# Patient Record
Sex: MALE
[De-identification: names, ages, dates, MRNs, and addresses within clinical notes are randomized per-mention and may not be internally consistent; named-entity substitution may affect disease eponyms.]

---

## 2018-03-21 ENCOUNTER — HOSPITAL ENCOUNTER (INPATIENT)
Dept: HOSPITAL 5 - ED | Age: 73
LOS: 6 days | Discharge: HOME | DRG: 871 | End: 2018-03-27
Attending: INTERNAL MEDICINE | Admitting: INTERNAL MEDICINE
Payer: MEDICARE

## 2018-03-21 DIAGNOSIS — E66.01: ICD-10-CM

## 2018-03-21 DIAGNOSIS — D69.6: ICD-10-CM

## 2018-03-21 DIAGNOSIS — R31.9: ICD-10-CM

## 2018-03-21 DIAGNOSIS — N13.30: ICD-10-CM

## 2018-03-21 DIAGNOSIS — Z87.442: ICD-10-CM

## 2018-03-21 DIAGNOSIS — N35.9: ICD-10-CM

## 2018-03-21 DIAGNOSIS — N39.0: ICD-10-CM

## 2018-03-21 DIAGNOSIS — N18.9: ICD-10-CM

## 2018-03-21 DIAGNOSIS — N13.9: ICD-10-CM

## 2018-03-21 DIAGNOSIS — A41.9: Primary | ICD-10-CM

## 2018-03-21 DIAGNOSIS — E87.1: ICD-10-CM

## 2018-03-21 DIAGNOSIS — Z79.01: ICD-10-CM

## 2018-03-21 DIAGNOSIS — R74.0: ICD-10-CM

## 2018-03-21 DIAGNOSIS — N17.0: ICD-10-CM

## 2018-03-21 DIAGNOSIS — N20.0: ICD-10-CM

## 2018-03-21 DIAGNOSIS — I48.2: ICD-10-CM

## 2018-03-21 DIAGNOSIS — G47.33: ICD-10-CM

## 2018-03-21 DIAGNOSIS — R65.21: ICD-10-CM

## 2018-03-21 DIAGNOSIS — I12.9: ICD-10-CM

## 2018-03-21 DIAGNOSIS — E11.22: ICD-10-CM

## 2018-03-21 DIAGNOSIS — N20.1: ICD-10-CM

## 2018-03-21 DIAGNOSIS — Z79.899: ICD-10-CM

## 2018-03-21 DIAGNOSIS — Z72.0: ICD-10-CM

## 2018-03-21 LAB
ALBUMIN SERPL-MCNC: 2.8 G/DL (ref 3.9–5)
ALT SERPL-CCNC: 58 UNITS/L (ref 7–56)
APTT BLD: 40.2 SEC. (ref 24.2–36.6)
APTT BLD: 43.9 SEC. (ref 24.2–36.6)
BACTERIA #/AREA URNS HPF: (no result) /HPF
BAND NEUTROPHILS # (MANUAL): 0.5 K/MM3
BASOPHILS # (AUTO): 0 K/MM3 (ref 0–0.1)
BASOPHILS NFR BLD AUTO: 0.3 % (ref 0–1.8)
BILIRUB UR QL STRIP: (no result)
BLOOD UR QL VISUAL: (no result)
BUN SERPL-MCNC: 70 MG/DL (ref 9–20)
BUN SERPL-MCNC: 70 MG/DL (ref 9–20)
BUN SERPL-MCNC: 79 MG/DL (ref 9–20)
BUN/CREAT SERPL: 21 %
BUN/CREAT SERPL: 21 %
BUN/CREAT SERPL: 23 %
CALCIUM SERPL-MCNC: 8.1 MG/DL (ref 8.4–10.2)
CALCIUM SERPL-MCNC: 8.6 MG/DL (ref 8.4–10.2)
CALCIUM SERPL-MCNC: 9.5 MG/DL (ref 8.4–10.2)
EOSINOPHIL # BLD AUTO: 0 K/MM3 (ref 0–0.4)
EOSINOPHIL NFR BLD AUTO: 0.2 % (ref 0–4.3)
HCT VFR BLD CALC: 43.3 % (ref 35.5–45.6)
HCT VFR BLD CALC: 48.7 % (ref 35.5–45.6)
HEMOLYSIS INDEX: 22
HEMOLYSIS INDEX: 3
HEMOLYSIS INDEX: 59
HGB BLD-MCNC: 14.2 GM/DL (ref 11.8–15.2)
HGB BLD-MCNC: 16.3 GM/DL (ref 11.8–15.2)
INR PPP: 1.59 (ref 0.87–1.13)
INR PPP: 1.65 (ref 0.87–1.13)
LYMPHOCYTES # BLD AUTO: 0.8 K/MM3 (ref 1.2–5.4)
LYMPHOCYTES NFR BLD AUTO: 6.5 % (ref 13.4–35)
MCH RBC QN AUTO: 30 PG (ref 28–32)
MCH RBC QN AUTO: 30 PG (ref 28–32)
MCHC RBC AUTO-ENTMCNC: 33 % (ref 32–34)
MCHC RBC AUTO-ENTMCNC: 33 % (ref 32–34)
MCV RBC AUTO: 91 FL (ref 84–94)
MCV RBC AUTO: 93 FL (ref 84–94)
MONOCYTES # (AUTO): 1.8 K/MM3 (ref 0–0.8)
MONOCYTES % (AUTO): 14.7 % (ref 0–7.3)
MUCOUS THREADS #/AREA URNS HPF: (no result) /HPF
MYELOCYTES # (MANUAL): 0.2 K/MM3
PH UR STRIP: 5 [PH] (ref 5–7)
PLATELET # BLD: 121 K/MM3 (ref 140–440)
PLATELET # BLD: 136 K/MM3 (ref 140–440)
PROMYELOCYTES # (MANUAL): 0 K/MM3
RBC # BLD AUTO: 4.67 M/MM3 (ref 3.65–5.03)
RBC # BLD AUTO: 5.37 M/MM3 (ref 3.65–5.03)
RBC #/AREA URNS HPF: > 182 /HPF (ref 0–6)
TOTAL CELLS COUNTED BLD: 100
UROBILINOGEN UR-MCNC: < 2 MG/DL (ref ?–2)
WBC #/AREA URNS HPF: 37 /HPF (ref 0–6)

## 2018-03-21 PROCEDURE — 80048 BASIC METABOLIC PNL TOTAL CA: CPT

## 2018-03-21 PROCEDURE — 85007 BL SMEAR W/DIFF WBC COUNT: CPT

## 2018-03-21 PROCEDURE — 85610 PROTHROMBIN TIME: CPT

## 2018-03-21 PROCEDURE — 96367 TX/PROPH/DG ADDL SEQ IV INF: CPT

## 2018-03-21 PROCEDURE — 82140 ASSAY OF AMMONIA: CPT

## 2018-03-21 PROCEDURE — 80053 COMPREHEN METABOLIC PANEL: CPT

## 2018-03-21 PROCEDURE — 82803 BLOOD GASES ANY COMBINATION: CPT

## 2018-03-21 PROCEDURE — C1769 GUIDE WIRE: HCPCS

## 2018-03-21 PROCEDURE — 96375 TX/PRO/DX INJ NEW DRUG ADDON: CPT

## 2018-03-21 PROCEDURE — 85730 THROMBOPLASTIN TIME PARTIAL: CPT

## 2018-03-21 PROCEDURE — 82962 GLUCOSE BLOOD TEST: CPT

## 2018-03-21 PROCEDURE — 87040 BLOOD CULTURE FOR BACTERIA: CPT

## 2018-03-21 PROCEDURE — 94640 AIRWAY INHALATION TREATMENT: CPT

## 2018-03-21 PROCEDURE — 93005 ELECTROCARDIOGRAM TRACING: CPT

## 2018-03-21 PROCEDURE — 96365 THER/PROPH/DIAG IV INF INIT: CPT

## 2018-03-21 PROCEDURE — 36415 COLL VENOUS BLD VENIPUNCTURE: CPT

## 2018-03-21 PROCEDURE — C1758 CATHETER, URETERAL: HCPCS

## 2018-03-21 PROCEDURE — 85025 COMPLETE CBC W/AUTO DIFF WBC: CPT

## 2018-03-21 PROCEDURE — 74176 CT ABD & PELVIS W/O CONTRAST: CPT

## 2018-03-21 PROCEDURE — 0T768DZ DILATION OF RIGHT URETER WITH INTRALUMINAL DEVICE, VIA NATURAL OR ARTIFICIAL OPENING ENDOSCOPIC: ICD-10-PCS | Performed by: UROLOGY

## 2018-03-21 PROCEDURE — C1726 CATH, BAL DIL, NON-VASCULAR: HCPCS

## 2018-03-21 PROCEDURE — 93010 ELECTROCARDIOGRAM REPORT: CPT

## 2018-03-21 PROCEDURE — C2617 STENT, NON-COR, TEM W/O DEL: HCPCS

## 2018-03-21 PROCEDURE — 81001 URINALYSIS AUTO W/SCOPE: CPT

## 2018-03-21 PROCEDURE — 93306 TTE W/DOPPLER COMPLETE: CPT

## 2018-03-21 PROCEDURE — 82805 BLOOD GASES W/O2 SATURATION: CPT

## 2018-03-21 PROCEDURE — A4217 STERILE WATER/SALINE, 500 ML: HCPCS

## 2018-03-21 PROCEDURE — 94760 N-INVAS EAR/PLS OXIMETRY 1: CPT

## 2018-03-21 PROCEDURE — 71045 X-RAY EXAM CHEST 1 VIEW: CPT

## 2018-03-21 PROCEDURE — 74018 RADEX ABDOMEN 1 VIEW: CPT

## 2018-03-21 PROCEDURE — 86140 C-REACTIVE PROTEIN: CPT

## 2018-03-21 PROCEDURE — 87086 URINE CULTURE/COLONY COUNT: CPT

## 2018-03-21 PROCEDURE — 36600 WITHDRAWAL OF ARTERIAL BLOOD: CPT

## 2018-03-21 PROCEDURE — 94660 CPAP INITIATION&MGMT: CPT

## 2018-03-21 RX ADMIN — EZETIMIBE SCH MG: 10 TABLET ORAL at 23:52

## 2018-03-21 RX ADMIN — OXYCODONE HYDROCHLORIDE AND ACETAMINOPHEN SCH MG: 500 TABLET ORAL at 23:51

## 2018-03-21 RX ADMIN — PRAVASTATIN SODIUM SCH MG: 80 TABLET ORAL at 23:52

## 2018-03-21 RX ADMIN — SODIUM CHLORIDE SCH MLS/HR: 0.9 INJECTION, SOLUTION INTRAVENOUS at 17:05

## 2018-03-21 RX ADMIN — Medication SCH ML: at 22:00

## 2018-03-21 RX ADMIN — ALBUTEROL SULFATE PRN MG: 2.5 SOLUTION RESPIRATORY (INHALATION) at 17:50

## 2018-03-21 NOTE — CONSULTATION
History of Present Illness


Consult date: 03/21/18


Requesting physician: JUNE APONTE


Reason for consult: other (Severe Sepsis)


History of present illness: 





PULMONARY/CCM CONSULT NOTE (Full dictation # [id number])


Please see dictated notes for full details





Medications and Allergies


 Allergies











Allergy/AdvReac Type Severity Reaction Status Date / Time


 


No Known Allergies Allergy   Verified 03/21/18 11:29











 Home Medications











 Medication  Instructions  Recorded  Confirmed  Last Taken  Type


 


ALBUTEROL Inhaler [Proair] 2 puff IH QID PRN 03/21/18 03/21/18 Unknown History


 


Allopurinol [Zyloprim] 300 mg PO QDAY 03/21/18 03/21/18 03/20/18 History


 


Apixaban [Eliquis] 5 mg PO BID 03/21/18 03/21/18 03/20/18 History


 


Ascorbic Acid [Vitamin C] 500 mg PO BID 03/21/18 03/21/18 03/20/18 History


 


Aspirin [Lo-Dose Aspirin EC] 81 mg PO DAILY 03/21/18 03/21/18 03/20/18 History


 


Calcium Carbonate/Vitamin D3 2 each PO DAILY 03/21/18 03/21/18 03/20/18 History





[Calcium 600-Vit D3 500 Softgel]     


 


Cyanocobalamin [Vitamin B-12] 1,000 mcg PO DAILY 03/21/18 03/21/18 03/20/18 

History


 


Dulaglutide [Trulicity] 1.5 mg SQ QWEEK 03/21/18 03/21/18 03/20/18 History


 


Ergocalciferol (Vitamin D2) 2,000 unit PO BID 03/21/18 03/21/18 Unknown History





[Vitamin D2]     


 


Ezetimibe/Simvastatin (Nf) 1 tab PO QHS 03/21/18 03/21/18 03/20/18 History





[Vytorin 10-40 mg (Nf)]     


 


Furosemide [Lasix TAB] 40 mg PO BID 03/21/18 03/21/18 03/20/18 History


 


Glimepiride [Amaryl] 4 mg PO QAM 03/21/18 03/21/18 03/20/18 History


 


Glucosamine/D3/Boswellia Kajal 1 each PO DAILY 03/21/18 03/21/18 03/20/18 

History





[Osteo Bi-Flex Tablet]     


 


Insulin Regular, Human [HumuLIN R] 0 unit SQ ACHS 03/21/18 03/21/18 03/20/18 

History


 


Linaclotide [Linzess] 145 mcg PO QDAY 03/21/18 03/21/18 03/20/18 History


 


Lisinopril [Zestril TAB] 40 mg PO QDAY 03/21/18 03/21/18 03/20/18 History


 


Multivit-Min/FA/Lycopen/Lutein 1 each PO DAILY 03/21/18 03/21/18 03/20/18 

History





[Adults 50+ Multivitamin Tablet]     


 


Naproxen Sodium [Aleve] 220 mg PO BID 03/21/18 03/21/18 03/20/18 History


 


Omega-3/Dha/Epa/Fish Oil [Fish Oil 1 each PO DAILY 03/21/18 03/21/18 03/20/18 

History





1,200 mg Softgel]     


 


Potassium Chloride [Klor-Con M20] 20 meq PO DAILY 03/21/18 03/21/18 03/20/18 

History


 


Zinc Acetate [Galzin] 50 mg PO 4XW 03/21/18 03/21/18 03/20/18 History











Active Meds: 


Active Medications





Piperacillin Sod/Tazobactam Sod (Zosyn/Ns 4.5gm/100ml)  4.5 gm in 100 mls @ 200 

mls/hr IV ONCE THEA


   Last Admin: 03/21/18 12:47 Dose:  200 mls/hr


Gentamicin Sulfate 160 mg/ (Sodium Chloride)  104 mls @ 200 mls/hr IV ONCE ONE; 

Protocol


   Stop: 03/21/18 14:07


Ceftriaxone Sodium (Rocephin/Ns 1 Gm/50 Ml)  1 gm in 50 mls @ 100 mls/hr IV 

ONCE ONE; Protocol


   Stop: 03/21/18 14:06











Physical Examination


Vital signs: 


 Vital Signs











Temp Pulse Resp BP Pulse Ox


 


 99.0 F   136 H  20   79/50   94 


 


 03/21/18 11:14  03/21/18 11:14  03/21/18 11:14  03/21/18 11:14  03/21/18 11:14














Results





- Laboratory Findings


CBC and BMP: 


 03/21/18 11:27





 03/21/18 11:27


PT/INR, D-dimer











PT  20.5 Sec. (12.2-14.9)  H  03/21/18  11:27    


 


INR  1.65  (0.87-1.13)  H  03/21/18  11:27    








Abnormal lab findings: 


 Abnormal Labs











  03/21/18 03/21/18 03/21/18





  11:27 11:27 11:27


 


WBC  12.1 H  


 


RBC  5.37 H  


 


Hgb  16.3 H  


 


Hct  48.7 H  


 


RDW  15.7 H  


 


Plt Count  136 L  


 


Lymph % (Auto)  6.5 L  


 


Mono % (Auto)  14.7 H  


 


Lymph #  0.8 L  


 


Mono #  1.8 H  


 


Seg Neutrophils %  78.3 H  


 


Seg Neutrophils #  9.5 H  


 


PT   20.5 H 


 


INR   1.65 H 


 


APTT   40.2 H 


 


VBG pH   


 


Sodium    133 L


 


Chloride    93.9 L


 


Carbon Dioxide    20 L


 


BUN    79 H


 


Creatinine    3.8 H


 


Glucose    153 H


 


Lactic Acid   


 


AST    75 H


 


ALT    58 H


 


Alkaline Phosphatase    142 H


 


Albumin    2.8 L














  03/21/18 03/21/18





  11:27 11:27


 


WBC  


 


RBC  


 


Hgb  


 


Hct  


 


RDW  


 


Plt Count  


 


Lymph % (Auto)  


 


Mono % (Auto)  


 


Lymph #  


 


Mono #  


 


Seg Neutrophils %  


 


Seg Neutrophils #  


 


PT  


 


INR  


 


APTT  


 


VBG pH   7.316 L


 


Sodium  


 


Chloride  


 


Carbon Dioxide  


 


BUN  


 


Creatinine  


 


Glucose  


 


Lactic Acid  2.30 H* 


 


AST  


 


ALT  


 


Alkaline Phosphatase  


 


Albumin

## 2018-03-21 NOTE — XRAY REPORT
FINAL REPORT



EXAM:  XR CHEST 1V AP



HISTORY:  SOB 



TECHNIQUE:  AP portable view of the chest



PRIORS:  None.



FINDINGS:  

Lines, tubes, and devices:  N/A



Lungs and pleura: Trachea is normal in position. Lungs are clear

of infiltrate, pleural effusion, vascular congestion, or

pneumothorax. 



Cardiomediastinal silhouette: Cardiac and mediastinal silhouettes

are unremarkable.



Other: Bony structures are intact.



IMPRESSION:  

No acute cardiopulmonary process seen.

## 2018-03-21 NOTE — POST ANESTHESIA EVALUATION
- Post Anesthesia Evaluation


Patient Participated: Yes


Airway Patent: Yes


Stable Respiratory Function: Yes


Nausea/Vomiting: No


Temp > 96.8F: Yes


Pain Manageable: Yes (Pt states he feels much better post op, No Pain)


Adequeate Hydration: No (Pt requiring pressors to maintain MAP 60.)


Anesthesia Complications: Yes (required epinephrine to maintain BP, stable in 

PACU)

## 2018-03-21 NOTE — CAT SCAN REPORT
CT ABDOMEN PELVIS WITHOUT CONTRAST:



HISTORY:  Recent UTI, air around kidney on recent CT.



COMPARISON: none.



TECHNIQUE:  Helical CT in 1.25mm intervals without IV contrast. 

Sagittal and coronal reconstructions. 





FINDINGS:



Lung bases: Normal.



Liver: Normal.



Biliary system: Normal.



Pancreas: Normal.



Spleen: Normal.



Kidneys/ureters/bladder: Bilateral nephrolithiasis is identified. A 1.4 

cm stone is identified in the mid to distal right ureter at the level 

of the pelvic brim. There is moderate right hydronephrosis. Normal 

bladder.



Adrenal glands: Normal.



Aorta: Normal.



Intestines: Normal.



Appendix: Normal.



Ascites: None.



Adenopathy: None.



Musculoskeletal: Thoracolumbar spondylosis.





IMPRESSION:

Bilateral nephrolithiasis. 1.4 cm stone in the mid to distal right 

ureter, obstructing.

## 2018-03-21 NOTE — ANESTHESIA CONSULTATION
Anesthesia Consult and Med Hx





- Airway


Anesthetic Teeth Evaluation: Good


ROM Head & Neck: Adequate


Mental/Hyoid Distance: Adequate


Mallampati Class: Class III


Intubation Access Assessment: Possibly Difficult





- Pulmonary Exam


CTA: Yes





- Cardiac Exam


Cardiac Exam: RRR





- Pre-Operative Health Status


ASA Pre-Surgery Classification: ASA3


Proposed Anesthetic Plan: General, MAC





- Pulmonary


SOB: Yes


Hx Sleep Apnea: Yes





- Cardiovascular System


Hx Hypertension: Yes


Hx Cardia Arrhythmia: Yes (Pt is on Eliquis for Afib. Currently in Afib 

confirmed by ECG.)


Hx Pacemaker: No


Hx Internal Defibrillator: No





- Endocrine


Hx Renal Disease: Yes (ARF, Large Calculi, BUN/Creat elevated. no hx of 

dialysis.)


Hx Non-Insulin Dependent Diabetes: Yes


Hx Thyroid Disease: No





- Hematic


Hx Anemia: No





- Other Systems


Hx Cancer: No


Hx Obesity: Yes (BMI 48.1)





- Additional Comments


Anesthesia Medical History Comments: Pt currently on Eliquis for Afib. Large 

Neck Circumference. JEAN/CPAPA. Has good peripheral IV access. Currently getting 

IV hydration for hypotension and sepsis. Not on any pressors at the time. Plan 

to take to OR for cystoscopy with MAC, and GA as backup.

## 2018-03-21 NOTE — OPERATIVE REPORT
PREOPERATIVE DIAGNOSES:  Urosepsis, obstructing right ureteral stone with

sepsis, hypotension.



POSTOPERATIVE DIAGNOSES:  Urosepsis, obstructing right ureteral stone with

sepsis, hypotension, and urethral stricture.



PROCEDURE:  Cystoscopy, direct vision internal urethrotomy, right ureteral

stent.



SURGEON:  Eduar Jacobson M.D.



ANESTHESIA:  General.



FINDINGS:  This is a gentleman who presented with sepsis.  He had hydronephrosis

compared to the CT 6 days ago and an obstructing stone.  He was hypotensive.  He

was hydrated and given broad-spectrum antibiotics.  He has renal failure and we

consulted Interventional Radiology who felt very risky to do a percutaneous

nephrostomy at this time, so we will try a stent.



DESCRIPTION OF PROCEDURE:  The patient was brought to the operating room and

placed on the operating table.  Following the induction of anesthesia, he was as

mentioned hypotensive.  We saw a stricture in the mid penile urethra and at the

bulbomembranous junction.  A wire coiled in the bladder and internal urethrotomy

was done.  Once we got into the bladder, the bladder neck was high riding.  We

found the orifice, which was quite a distance away from the urethral meatus.  We

were able to place a wire into the kidney and place a 6-Yoruba double J.  The

patient tolerated the procedure well.  He is in guarded condition.  He has got a

much better blood pressure.  Lots of pus came out of the kidney, which was sent

for culture and a Quiñones catheter was placed with the Councill catheter over a

wire.  He was brought to recovery in stable condition.





DD: 03/21/2018 16:39

DT: 03/21/2018 17:02

JOB# [id number]  [id number]

NIHARIKA/UMAIR

## 2018-03-21 NOTE — XRAY REPORT
AP CHEST:



HISTORY: Possible sepsis



AP view of the chest demonstrates a normal mediastinal and

cardiac contour with clear lungs and normal bony and soft tissue

structures.



IMPRESSION:

Unremarkable AP chest.

## 2018-03-21 NOTE — CONSULTATION
CONSULTING PHYSICIAN:  Dr. Lyndsey Elizabeth, Emergency Room doctor.



REASON FOR CONSULTATION:  Severe sepsis, right obstructing ureteral stone, NATALIE.



CHIEF COMPLAINT AND HISTORY OF PRESENT ILLNESS:  The patient is a 72-year-old

 male with past medical history amongst other things significant for a

diagnoses of atrial fibrillation, on Eliquis, but also nephrolithiasis and

morbidly obese.  He actually tells me he has obstructive sleep apnea.  He is on

home CPAP therapy.  He came into the Emergency Room, actually directed there

from his urologist's office.  He stated that he had seen the urologist a few

days back and was treated with antibiotics for essentially dysuria.  The pain

got much better.  However, he said that when he went for a followup visit, he

was told to come into the Emergency Room.  This really is secondary to a repeat

CT scan which showed an obstructive stone with obstructive uropathy.  In the

Emergency Room, he was found to be hypotensive and hence the consult.  He has

tentatively been scheduled to be taken into the OR I believe for a cystoscopy

plus or minus other intervention by the urologist.  When I stopped by to see

him, he was resting in bed.  He was lying flat, denied any orthopnea.  He denied

any dizziness and really did not show any significant confusion during the

discussion.  Nevertheless, his mean arterial pressures ran around 50 mmHg at

that time.  He denied nausea, vomiting, or overt aspiration.  Denied any real

fevers or chills.  Denied any new onset leg pain or swelling either unilaterally

or bilaterally.  He has been compliant with his medications including his CPAP

therapy at bedtime.  He has about a 10+ pack-year tobacco smoking history, but

denies any current tobacco use.  It really is as much of the history of

presentation as I have.



PAST MEDICAL HISTORY:  Again, significant for diabetes, atrial fibrillation,

morbid obesity, obstructive sleep apnea, hypertension and nephrolithiasis.



PAST SURGICAL HISTORY:  Unknown.



MEDICATIONS:  He was on at the time I stopped by to see him, according to the

medication administration record included the following:  He was on p.r.n.

albuterol, vitamin C 500 mg p.o. b.i.d., Rocephin he received 1 gram dose,

vitamin B12 1000 mcg p.o. daily, Lasix 40 mg p.o. b.i.d., gentamicin he received

160 mg IV once, he is on insulin via sliding scale, lisinopril 40 mg p.o. daily,

all the nonformulary medications.  He received Zosyn 4.5 grams IV x 1.



ALLERGIES:  No known drug allergies.



DIET:  Morbidly obese.  Denies significant weight loss or gain in the preceding

few weeks to months.



FAMILY AND SOCIAL HISTORY:  Lives in the community.  A 10+ pack-year tobacco

smoking history.  No active smoking.  Denies alcohol or illicit drug use or

abuse.  Family history, otherwise noncontributory.



REVIEW OF SYSTEMS:  A complete 13-system review of system was obtained.  He

denied any loss of consciousness.  No new onset seizures.  No new onset focal

weakness.  No gross hematochezia or melena.  No gross hematuria.  He actually

denies dysuria.  No hematemesis.  No hemoptysis.  He denies palpitations.  He

denies polyuria or polydipsia.  A complete 13-system review of systems was

obtained.  Pertinent positives and/or negatives as in body of history above,

otherwise they are noncontributory.



PHYSICAL EXAMINATION:

VITAL SIGNS:  At presentation over here, he had a low grade fever, temperature

99.0 degrees Fahrenheit with a pulse of 136, respiratory rate of 18, blood

pressure 79/50, oxygen sats 94%, inspired oxygen concentration was not recorded.

 He was on 3 liters nasal cannula when I saw him.  

GENERAL:  He is a morbidly obese  elderly.  Actually, looks a little

younger than his stated age, normocephalic, atraumatic, in mild distress,

respiratory and nonrespiratory.

HEAD, EYES, EARS, NOSE AND THROAT:  He is anicteric.  No conjunctival erythema. 

He has a large neck circumference.  His oropharynx is a Mallampati #3

oropharynx.  No gross jugular venous distention.  Grossly, no palpable lymph

nodes in the supraclavicular or submandibular lymph node chains.  No

thyromegaly.

LUNGS:  Auscultation of both lung fields significant only for diminished

bilateral breath sounds.  No active wheezing at the time of my evaluation.

ABDOMEN:  Full, soft.  Bowel sounds are positive, not tender.  No palpable

hepatosplenomegaly grossly.  

GENITOURINARY:  Mild suprapubic tenderness.  No overt swelling or cellulitis.

EXTREMITIES:  Without overt digital clubbing or cyanosis.  Trace pedal edema. 

Dorsalis pedis pulses are palpable bilaterally.

NEUROLOGIC:  Pupils equal, round, about 4 mm, reactive to light.  Extraocular

muscle movements are intact.  He moves all 4 extremities spontaneously.  

SKIN:  The skin is of poor turgor with chronic venous stasis type changes to the

lower extremities.  No cellulitis.



LABORATORY AND IMAGING DATA:  From my review are as follows:  White cell count

12,100, hemoglobin 16.3, hematocrit 48.7, platelet count 136.  INR was 1.65. 

Venous blood gas showed a pH of 7.32.  Serum sodium 133, potassium 4.5, chloride

94, bicarbonate 20, BUN 79, creatinine 3.8.  Glucose was 153.  Lactic acid level

was elevated at 2.3, AST was up at 75, ALT 58, albumin low at 2.8, otherwise no

significant LFT derangements.  Urinalysis, trace leukocyte esterase, 37 white

cells per high power field.  He was spilling glucose also in the urine.  Blood

cultures, no growth to date.  Chest x-ray was done.  I have reviewed the chest

x-ray.  I have also reviewed the radiologist's interpretation.  I do agree with

him taking the soft tissue interference in the imaging and the fact that it is

also a lordotic film, I do not see significant cardiomegaly, perhaps mild

interstitial edema if at all anything in an area of likely platelike atelectasis

in the right lower lobe region.  No gross pneumothorax, no gross bony fractures.



ASSESSMENT AND PLAN:

1.  Severe sepsis with shock.

2.  Nephrolithiasis with obstructive uropathy.  I should mention that a CT of

the abdomen and pelvis done today showed bilateral nephrolithiasis with a 1.4 cm

stone in the mid to distal right ureter that was obstructing.

3.  Morbid obesity.

4.  Obstructive sleep apnea.

5.  Leukocytosis.

6.  Acute kidney injury on chronic according to his urologist, the creatinine

was lower prior to this admission.

7.  Diagnosis of mild hyponatremia.  

8.  He has a mild metabolic acidosis.  

9.  He has also lactic acidosis.  

10.  He has elevated serum transaminases.  



PLAN:  Initiate sepsis pathway.  Volume resuscitation is ongoing with

crystalloids at this point.   We will trend his lactic acid level post-volume

resuscitation.  Consideration will be given for vasopressors if mean arterial

pressures are still below 60.  We will also order a CRP level and trend as

necessary during this admission.  He is appropriately on broad-spectrum

antibiotic therapies.  Infectious Disease consultation may be in order.  I will

schedule bilevel positive air pressure ventilation therapy at bedtime.  I will

go with an empiric number of 18/8 based on my discussions with him a backup rate

of about 10 at bedtime and p.r.n. during the day.  Oxygen will be weaned to keep

sats greater than or equal to about 90%.  He will benefit from an outpatient

pulmonary clinic evaluation and PFTs.  He will be placed on GI prophylaxis as

well as DVT prophylaxis ultimately if we are unable to resume his Eliquis post

procedure.  Vasopressors if instituted, will be titrated to keep mean arterial

pressures greater than or equal to about 65 mmHg.  Flu and pneumonia vaccination

will be addressed per protocol.  Weight loss has been consulted.  Continued

tobacco abstinence has been encouraged.  Glycemic control will be via sliding

scale insulin, and/or any other oral hypoglycemic he may be on at home.  He

apparently is also on some Lasix at home.  He may benefit from a 2D

echocardiogram if his hemodynamic status deteriorates.  Flu and pneumonia

vaccination will be addressed per protocol.



Thank you very much for the consult, Dr. Elizabeth.  We will follow along and make

further recommendations as picture progresses/becomes clearer.  He is critically

ill, at risk for further decompensation including death and will be followed and

admitted to the Intensive Care Unit steph or postoperatively.





DD: 03/21/2018 14:12

DT: 03/21/2018 21:08

JOB# [id number]  [id number]

VALORIE/UMAIR SANCHEZ

## 2018-03-21 NOTE — EMERGENCY DEPARTMENT REPORT
- General


Chief complaint: Weakness


Stated complaint: CT


Time Seen by Provider: 03/21/18 11:21


Source: patient, family


Mode of arrival: Wheelchair


Limitations: No Limitations





- History of Present Illness


Initial comments: 





72-year-old male with a past medical history of atrial fibrillation on Eliquis, 

kidney stones, sleep apnea, morbid obesity, diabetes, and hypertension presents 

to the hospital with complaint of generalized weakness, fatigue, shortness of 

breath, and low blood pressure.  Patient has been on Eliquis for the past 3-4 

months for atrial fibrillation and has been experiencing hematuria.  Hematuria 

worsened and therefore he saw Dr. Miranda on the 16th.  Patient was diagnosed 

with UTI and had a CT that showed air around the kidneys and was placed on 

Levaquin.  By the evening patient began to have aching moderate right lower and 

right mid abdominal pain.  Positive nausea with eating without vomiting 

reported.  Positive chills.  Patient denies dysuria and states that hematuria 

has greatly improved.  Patient went to his urologist office today sent to the 

ER for evaluation.  Patient hypotensive and tachycardic on arrival.





- Related Data


 Home Medications











 Medication  Instructions  Recorded  Confirmed  Last Taken


 


ALBUTEROL Inhaler [Proair] 2 puff IH QID PRN 03/21/18 03/21/18 Unknown


 


Allopurinol [Zyloprim] 300 mg PO QDAY 03/21/18 03/21/18 03/20/18


 


Apixaban [Eliquis] 5 mg PO BID 03/21/18 03/21/18 03/20/18


 


Ascorbic Acid [Vitamin C] 500 mg PO BID 03/21/18 03/21/18 03/20/18


 


Aspirin [Lo-Dose Aspirin EC] 81 mg PO DAILY 03/21/18 03/21/18 03/20/18


 


Calcium Carbonate/Vitamin D3 2 each PO DAILY 03/21/18 03/21/18 03/20/18





[Calcium 600-Vit D3 500 Softgel]    


 


Cyanocobalamin [Vitamin B-12] 1,000 mcg PO DAILY 03/21/18 03/21/18 03/20/18


 


Dulaglutide [Trulicity] 1.5 mg SQ QWEEK 03/21/18 03/21/18 03/20/18


 


Ergocalciferol (Vitamin D2) 2,000 unit PO BID 03/21/18 03/21/18 Unknown





[Vitamin D2]    


 


Ezetimibe/Simvastatin (Nf) 1 tab PO QHS 03/21/18 03/21/18 03/20/18





[Vytorin 10-40 mg (Nf)]    


 


Furosemide [Lasix TAB] 40 mg PO BID 03/21/18 03/21/18 03/20/18


 


Glimepiride [Amaryl] 4 mg PO QAM 03/21/18 03/21/18 03/20/18


 


Glucosamine/D3/Boswellia Kajal 1 each PO DAILY 03/21/18 03/21/18 03/20/18





[Osteo Bi-Flex Tablet]    


 


Insulin Regular, Human [HumuLIN R] 0 unit SQ ACHS 03/21/18 03/21/18 03/20/18


 


Linaclotide [Linzess] 145 mcg PO QDAY 03/21/18 03/21/18 03/20/18


 


Lisinopril [Zestril TAB] 40 mg PO QDAY 03/21/18 03/21/18 03/20/18


 


Multivit-Min/FA/Lycopen/Lutein 1 each PO DAILY 03/21/18 03/21/18 03/20/18





[Adults 50+ Multivitamin Tablet]    


 


Naproxen Sodium [Aleve] 220 mg PO BID 03/21/18 03/21/18 03/20/18


 


Omega-3/Dha/Epa/Fish Oil [Fish Oil 1 each PO DAILY 03/21/18 03/21/18 03/20/18





1,200 mg Softgel]    


 


Potassium Chloride [Klor-Con M20] 20 meq PO DAILY 03/21/18 03/21/18 03/20/18


 


Zinc Acetate [Galzin] 50 mg PO 4XW 03/21/18 03/21/18 03/20/18











 Allergies











Allergy/AdvReac Type Severity Reaction Status Date / Time


 


No Known Allergies Allergy   Verified 03/21/18 11:29














ED Review of Systems


ROS: 


Stated complaint: CT


Other details as noted in HPI





Comment: All other systems reviewed and negative


Other: 





Constitutional: + chills


Eyes: No eye pain visual changes or discharge


ENT: No ear pain or throat pain


Neck: Denies pain


Respiratory: Denies cough


Cardiovascular: Denies chest pain, palpitation. + lightheaded


GI: Denies abdominal pain, nausea, vomiting, diarrhea, constipation, melena 

hematochezia


: As per HPI


Musculoskeletal: Denies back pain, chronic leg edema


Skin: Denies rash, lesions, erythema


Neurologic: Denies headache, numbness.  Generalized weakness


Psychiatric: Denies suicidal ideation, hallucinations


Hematological/lymphatic: Denies easy bruising, lymphadenopathy





ED Past Medical Hx





- Past Medical History


Hx Hypertension: Yes


Hx Diabetes: Yes


Additional medical history: A fib on Eliquis.  Reports negative cardiac cath 

about 2015





- Surgical History


Past Surgical History?: Yes


Additional Surgical History: Tonsillectomy





- Social History


Smoking Status: Former Smoker





- Medications


Home Medications: 


 Home Medications











 Medication  Instructions  Recorded  Confirmed  Last Taken  Type


 


ALBUTEROL Inhaler [Proair] 2 puff IH QID PRN 03/21/18 03/21/18 Unknown History


 


Allopurinol [Zyloprim] 300 mg PO QDAY 03/21/18 03/21/18 03/20/18 History


 


Apixaban [Eliquis] 5 mg PO BID 03/21/18 03/21/18 03/20/18 History


 


Ascorbic Acid [Vitamin C] 500 mg PO BID 03/21/18 03/21/18 03/20/18 History


 


Aspirin [Lo-Dose Aspirin EC] 81 mg PO DAILY 03/21/18 03/21/18 03/20/18 History


 


Calcium Carbonate/Vitamin D3 2 each PO DAILY 03/21/18 03/21/18 03/20/18 History





[Calcium 600-Vit D3 500 Softgel]     


 


Cyanocobalamin [Vitamin B-12] 1,000 mcg PO DAILY 03/21/18 03/21/18 03/20/18 

History


 


Dulaglutide [Trulicity] 1.5 mg SQ QWEEK 03/21/18 03/21/18 03/20/18 History


 


Ergocalciferol (Vitamin D2) 2,000 unit PO BID 03/21/18 03/21/18 Unknown History





[Vitamin D2]     


 


Ezetimibe/Simvastatin (Nf) 1 tab PO QHS 03/21/18 03/21/18 03/20/18 History





[Vytorin 10-40 mg (Nf)]     


 


Furosemide [Lasix TAB] 40 mg PO BID 03/21/18 03/21/18 03/20/18 History


 


Glimepiride [Amaryl] 4 mg PO QAM 03/21/18 03/21/18 03/20/18 History


 


Glucosamine/D3/Boswellia Kajal 1 each PO DAILY 03/21/18 03/21/18 03/20/18 

History





[Osteo Bi-Flex Tablet]     


 


Insulin Regular, Human [HumuLIN R] 0 unit SQ ACHS 03/21/18 03/21/18 03/20/18 

History


 


Linaclotide [Linzess] 145 mcg PO QDAY 03/21/18 03/21/18 03/20/18 History


 


Lisinopril [Zestril TAB] 40 mg PO QDAY 03/21/18 03/21/18 03/20/18 History


 


Multivit-Min/FA/Lycopen/Lutein 1 each PO DAILY 03/21/18 03/21/18 03/20/18 

History





[Adults 50+ Multivitamin Tablet]     


 


Naproxen Sodium [Aleve] 220 mg PO BID 03/21/18 03/21/18 03/20/18 History


 


Omega-3/Dha/Epa/Fish Oil [Fish Oil 1 each PO DAILY 03/21/18 03/21/18 03/20/18 

History





1,200 mg Softgel]     


 


Potassium Chloride [Klor-Con M20] 20 meq PO DAILY 03/21/18 03/21/18 03/20/18 

History


 


Zinc Acetate [Galzin] 50 mg PO 4XW 03/21/18 03/21/18 03/20/18 History














ED Physical Exam





- General


Limitations: No Limitations





- Other


Other exam information: 





General: No limitations, patient is alert in no acute distress


Head exam: Atraumatic, normocephalic


Eyes exam: Normal appearance


ENT: Moist mucous membrane, normal oropharynx


Neck exam: Normal inspection, full range of motion, no meningismus nontender


Respiratory exam: Clear to auscultation


Cardiovascular: Tachycardic irregular rhythm


Abdomen: Soft, nondistended, and  nontender, with normal bowel sounds, no 

rebound, or guarding


Extremity: Full range of motion, significant bilateral leg edema


Back: Normal Inspection, full range of motion, no tenderness


Neurologic: Alert, oriented x3, cranial nerves intact, no motor or sensory 

deficit


Psychiatric: normal affect, normal mood


Skin: Warm, dry, intact





ED Course


 Vital Signs











  03/21/18 03/21/18 03/21/18





  11:14 11:42 11:45


 


Temperature 99.0 F  


 


Pulse Rate 136 H 122 H 112 H


 


Respiratory 20 18 26 H





Rate   


 


Blood Pressure 79/50  





[Right]   


 


O2 Sat by Pulse 94  





Oximetry   














  03/21/18 03/21/18 03/21/18





  13:58 14:30 15:30


 


Temperature   


 


Pulse Rate 102 H 102 H 117 H


 


Respiratory 16 16 16





Rate   


 


Blood Pressure 69/46 85/36 108/54





[Right]   


 


O2 Sat by Pulse 97 97 97





Oximetry   














- Reevaluation(s)


Reevaluation #1: 





03/21/18 14:01


sbp remains in 80's after 1.5 L NS


Reevaluation #2: 





03/21/18 15:36


Systolic blood pressure above 100 prior to transfer upstairs for surgery





- Consultations


Consultation #1: 





03/21/18 13:38


Dr Jacobson called Ed to enquire about his pt. .  The results.  Patient has a 

new ureteral stone with obstruction.  Dr. CHRIS reviewed urine cultures were 

performed as an outpatient.  Positive Escherichia coli that is sensitive to 

everything except for ampicillin and Zosyn.  (sensitive to fluoroquinolones, 

Rocephin, and gentamicin)





Consultation #2: 





03/21/18 13:40





case d/w Dr Bunch (IR) will consult


Consultation #3: 





03/21/18 13:59


Case d/w Dr Mcmanus (critical care/pulm): will consult





ED Medical Decision Making





- Lab Data


Result diagrams: 


 03/21/18 11:27





 03/21/18 11:27








 Lab Results











  03/21/18 03/21/18 03/21/18 Range/Units





  11:27 11:27 11:27 


 


WBC  12.1 H    (4.5-11.0)  K/mm3


 


RBC  5.37 H    (3.65-5.03)  M/mm3


 


Hgb  16.3 H    (11.8-15.2)  gm/dl


 


Hct  48.7 H    (35.5-45.6)  %


 


MCV  91    (84-94)  fl


 


MCH  30    (28-32)  pg


 


MCHC  33    (32-34)  %


 


RDW  15.7 H    (13.2-15.2)  %


 


Plt Count  136 L    (140-440)  K/mm3


 


Lymph % (Auto)  6.5 L    (13.4-35.0)  %


 


Mono % (Auto)  14.7 H    (0.0-7.3)  %


 


Eos % (Auto)  0.2    (0.0-4.3)  %


 


Baso % (Auto)  0.3    (0.0-1.8)  %


 


Lymph #  0.8 L    (1.2-5.4)  K/mm3


 


Mono #  1.8 H    (0.0-0.8)  K/mm3


 


Eos #  0.0    (0.0-0.4)  K/mm3


 


Baso #  0.0    (0.0-0.1)  K/mm3


 


Seg Neutrophils %  78.3 H    (40.0-70.0)  %


 


Seg Neutrophils #  9.5 H    (1.8-7.7)  K/mm3


 


PT   20.5 H   (12.2-14.9)  Sec.


 


INR   1.65 H   (0.87-1.13)  


 


APTT   40.2 H   (24.2-36.6)  Sec.


 


VBG pH     (7.320-7.420)  


 


Sodium    133 L  (137-145)  mmol/L


 


Potassium    4.5  (3.6-5.0)  mmol/L


 


Chloride    93.9 L  ()  mmol/L


 


Carbon Dioxide    20 L  (22-30)  mmol/L


 


Anion Gap    24  mmol/L


 


BUN    79 H  (9-20)  mg/dL


 


Creatinine    3.8 H  (0.8-1.5)  mg/dL


 


Estimated GFR    16  ml/min


 


BUN/Creatinine Ratio    21  %


 


Glucose    153 H  ()  mg/dL


 


Lactic Acid     (0.7-2.0)  mmol/L


 


Calcium    9.5  (8.4-10.2)  mg/dL


 


Total Bilirubin    0.90  (0.1-1.2)  mg/dL


 


AST    75 H  (5-40)  units/L


 


ALT    58 H  (7-56)  units/L


 


Alkaline Phosphatase    142 H  ()  units/L


 


C-Reactive Protein     (0.00-1.30)  mg/dL


 


Total Protein    6.5  (6.3-8.2)  g/dL


 


Albumin    2.8 L  (3.9-5)  g/dL


 


Albumin/Globulin Ratio    0.8  %


 


Urine Color     (Yellow)  


 


Urine Turbidity     (Clear)  


 


Urine pH     (5.0-7.0)  


 


Ur Specific Gravity     (1.003-1.030)  


 


Urine Protein     (Negative)  mg/dL


 


Urine Glucose (UA)     (Negative)  mg/dL


 


Urine Ketones     (Negative)  mg/dL


 


Urine Blood     (Negative)  


 


Urine Nitrite     (Negative)  


 


Urine Bilirubin     (Negative)  


 


Urine Urobilinogen     (<2.0)  mg/dL


 


Ur Leukocyte Esterase     (Negative)  


 


Urine WBC (Auto)     (0.0-6.0)  /HPF


 


Urine RBC (Auto)     (0.0-6.0)  /HPF


 


U Epithel Cells (Auto)     (0-13.0)  /HPF


 


Urine Bacteria (Auto)     (Negative)  /HPF


 


Urine Mucus     /HPF














  03/21/18 03/21/18 03/21/18 Range/Units





  11:27 11:27 11:27 


 


WBC     (4.5-11.0)  K/mm3


 


RBC     (3.65-5.03)  M/mm3


 


Hgb     (11.8-15.2)  gm/dl


 


Hct     (35.5-45.6)  %


 


MCV     (84-94)  fl


 


MCH     (28-32)  pg


 


MCHC     (32-34)  %


 


RDW     (13.2-15.2)  %


 


Plt Count     (140-440)  K/mm3


 


Lymph % (Auto)     (13.4-35.0)  %


 


Mono % (Auto)     (0.0-7.3)  %


 


Eos % (Auto)     (0.0-4.3)  %


 


Baso % (Auto)     (0.0-1.8)  %


 


Lymph #     (1.2-5.4)  K/mm3


 


Mono #     (0.0-0.8)  K/mm3


 


Eos #     (0.0-0.4)  K/mm3


 


Baso #     (0.0-0.1)  K/mm3


 


Seg Neutrophils %     (40.0-70.0)  %


 


Seg Neutrophils #     (1.8-7.7)  K/mm3


 


PT     (12.2-14.9)  Sec.


 


INR     (0.87-1.13)  


 


APTT     (24.2-36.6)  Sec.


 


VBG pH   7.316 L   (7.320-7.420)  


 


Sodium     (137-145)  mmol/L


 


Potassium     (3.6-5.0)  mmol/L


 


Chloride     ()  mmol/L


 


Carbon Dioxide     (22-30)  mmol/L


 


Anion Gap     mmol/L


 


BUN     (9-20)  mg/dL


 


Creatinine     (0.8-1.5)  mg/dL


 


Estimated GFR     ml/min


 


BUN/Creatinine Ratio     %


 


Glucose     ()  mg/dL


 


Lactic Acid  2.30 H*    (0.7-2.0)  mmol/L


 


Calcium     (8.4-10.2)  mg/dL


 


Total Bilirubin     (0.1-1.2)  mg/dL


 


AST     (5-40)  units/L


 


ALT     (7-56)  units/L


 


Alkaline Phosphatase     ()  units/L


 


C-Reactive Protein    12.40 H  (0.00-1.30)  mg/dL


 


Total Protein     (6.3-8.2)  g/dL


 


Albumin     (3.9-5)  g/dL


 


Albumin/Globulin Ratio     %


 


Urine Color     (Yellow)  


 


Urine Turbidity     (Clear)  


 


Urine pH     (5.0-7.0)  


 


Ur Specific Gravity     (1.003-1.030)  


 


Urine Protein     (Negative)  mg/dL


 


Urine Glucose (UA)     (Negative)  mg/dL


 


Urine Ketones     (Negative)  mg/dL


 


Urine Blood     (Negative)  


 


Urine Nitrite     (Negative)  


 


Urine Bilirubin     (Negative)  


 


Urine Urobilinogen     (<2.0)  mg/dL


 


Ur Leukocyte Esterase     (Negative)  


 


Urine WBC (Auto)     (0.0-6.0)  /HPF


 


Urine RBC (Auto)     (0.0-6.0)  /HPF


 


U Epithel Cells (Auto)     (0-13.0)  /HPF


 


Urine Bacteria (Auto)     (Negative)  /HPF


 


Urine Mucus     /HPF














  03/21/18 Range/Units





  13:49 


 


WBC   (4.5-11.0)  K/mm3


 


RBC   (3.65-5.03)  M/mm3


 


Hgb   (11.8-15.2)  gm/dl


 


Hct   (35.5-45.6)  %


 


MCV   (84-94)  fl


 


MCH   (28-32)  pg


 


MCHC   (32-34)  %


 


RDW   (13.2-15.2)  %


 


Plt Count   (140-440)  K/mm3


 


Lymph % (Auto)   (13.4-35.0)  %


 


Mono % (Auto)   (0.0-7.3)  %


 


Eos % (Auto)   (0.0-4.3)  %


 


Baso % (Auto)   (0.0-1.8)  %


 


Lymph #   (1.2-5.4)  K/mm3


 


Mono #   (0.0-0.8)  K/mm3


 


Eos #   (0.0-0.4)  K/mm3


 


Baso #   (0.0-0.1)  K/mm3


 


Seg Neutrophils %   (40.0-70.0)  %


 


Seg Neutrophils #   (1.8-7.7)  K/mm3


 


PT   (12.2-14.9)  Sec.


 


INR   (0.87-1.13)  


 


APTT   (24.2-36.6)  Sec.


 


VBG pH   (7.320-7.420)  


 


Sodium   (137-145)  mmol/L


 


Potassium   (3.6-5.0)  mmol/L


 


Chloride   ()  mmol/L


 


Carbon Dioxide   (22-30)  mmol/L


 


Anion Gap   mmol/L


 


BUN   (9-20)  mg/dL


 


Creatinine   (0.8-1.5)  mg/dL


 


Estimated GFR   ml/min


 


BUN/Creatinine Ratio   %


 


Glucose   ()  mg/dL


 


Lactic Acid   (0.7-2.0)  mmol/L


 


Calcium   (8.4-10.2)  mg/dL


 


Total Bilirubin   (0.1-1.2)  mg/dL


 


AST   (5-40)  units/L


 


ALT   (7-56)  units/L


 


Alkaline Phosphatase   ()  units/L


 


C-Reactive Protein   (0.00-1.30)  mg/dL


 


Total Protein   (6.3-8.2)  g/dL


 


Albumin   (3.9-5)  g/dL


 


Albumin/Globulin Ratio   %


 


Urine Color  Zamzam  (Yellow)  


 


Urine Turbidity  Clear  (Clear)  


 


Urine pH  5.0  (5.0-7.0)  


 


Ur Specific Gravity  1.010  (1.003-1.030)  


 


Urine Protein  30 mg/dl  (Negative)  mg/dL


 


Urine Glucose (UA)  >=500  (Negative)  mg/dL


 


Urine Ketones  Neg  (Negative)  mg/dL


 


Urine Blood  Lg  (Negative)  


 


Urine Nitrite  Neg  (Negative)  


 


Urine Bilirubin  Neg  (Negative)  


 


Urine Urobilinogen  < 2.0  (<2.0)  mg/dL


 


Ur Leukocyte Esterase  Tr  (Negative)  


 


Urine WBC (Auto)  37.0 H  (0.0-6.0)  /HPF


 


Urine RBC (Auto)  > 182.0  (0.0-6.0)  /HPF


 


U Epithel Cells (Auto)  1.0  (0-13.0)  /HPF


 


Urine Bacteria (Auto)  1+  (Negative)  /HPF


 


Urine Mucus  Few  /HPF














- EKG Data


-: EKG Interpreted by Me (afib, low voltage)


EKG shows normal: axis (52), QRS complexes (105), ST-T waves (no stemi/t inv)


Rate: tachycardia (117)





- EKG Data


When compared to previous EKG there are: previous EKG unavailable





- Radiology Data


Radiology results: report reviewed





Read by radiologist


Bilateral nephrolithiasis.  1.4 cm stone in the mid to distal right ureter, 

Obstructing





- Medical Decision Making





Sepsis/UTI


Positive UTI diagnosed on recent urology evaluation.  Cultures pansensitive 

with exception of Zosyn and ampicillin.


Patient initially received Zosyn but after sensitivities were reported by 

urologist patient was treated with gentamicin and Rocephin


After 2.5 L patient remains hypotensive third liter in progress


Interventional radiologist at [facility] and anesthesia came to bedside to consent 

patient. Anesthesia on standby to perform Central line in OR as necessary


Dr. Mcmanus intensivist evaluated patient at this


Dr. Daniels consulted


blood urine cultures pending





Acute on chronic renal failure


Likely secondary to obstructive uropathy


Patient about to go to surgery


IVF 





- Differential Diagnosis


UTI, sepsis, dehydration


Critical Care Time: No


Critical care attestation.: 


If time is entered above; I have spent that time in minutes in the direct care 

of this critically ill patient, excluding procedure time.








ED Disposition


Clinical Impression: 


 Sepsis, UTI (urinary tract infection), Obstructive uropathy, Acute on chronic 

renal failure, Morbid obesity, Chronic atrial fibrillation, Chronic 

anticoagulation, Diabetes, Elevated lactic acid level, Nephrolithiasis





Disposition: DC-09 OP ADMIT IP TO THIS HOSP


Is pt being admited?: Yes


Condition: Stable


Time of Disposition: 13:58 (Dr Vera/hosp)

## 2018-03-21 NOTE — ANESTHESIA DAY OF SURGERY
Anesthesia Day of Surgery





- Day of Surgery


Patient Examined: Yes


Patient H&P Reviewed: Yes


Patient is NPO: No (pt had coffee w/ cream at 05:30. 3/21/18)

## 2018-03-21 NOTE — HISTORY AND PHYSICAL REPORT
History of Present Illness


Chief complaint: 





I feel sick


History of present illness: 


71 YO Male with Atrial Fib on therapeutic anticoagulation with Eliquis, 

Nephrolithiasis, MO, DM, HTN presents to ED for evaluation. Pt states that he 

has experienced  generalized weakness, fatigue, shortness of breath, Nausea, 

Vomiting, over the past 2 days with worsening symptoms over the past 12 hours. 

Pt also complains of low blood pressure.    Patient was seen and evaluated by 

his urologist and diagnosed with UTI and placed on Levaquin.  Pt acknowledges 

hematuria as well as worsening symptoms and presented to ED for evaluation. Pt 

seen and evaluated in ED and found to have sepsis, secondary to obstructive 

uropathy with hydronephrosis. Urology consulted in ED and patient taken 

urgently to OR. Pt treated [facility] sepsis protocol, and admitted to ICU.  





Past History


Past Medical History: atrial fib, diabetes, hypertension, other (Morbid Obesity)


Past Surgical History: No surgical history, Other (reviewed)


Social history: .  denies: smoking, alcohol abuse, prescription drug 

abuse, IV drug use


Family history: no significant family history (reviewed)





Medications and Allergies


 Allergies











Allergy/AdvReac Type Severity Reaction Status Date / Time


 


No Known Allergies Allergy   Verified 03/21/18 11:29











 Home Medications











 Medication  Instructions  Recorded  Confirmed  Last Taken  Type


 


ALBUTEROL Inhaler [Proair] 2 puff IH QID PRN 03/21/18 03/21/18 Unknown History


 


Allopurinol [Zyloprim] 300 mg PO QDAY 03/21/18 03/21/18 03/20/18 History


 


Apixaban [Eliquis] 5 mg PO BID 03/21/18 03/21/18 03/20/18 History


 


Ascorbic Acid [Vitamin C] 500 mg PO BID 03/21/18 03/21/18 03/20/18 History


 


Aspirin [Lo-Dose Aspirin EC] 81 mg PO DAILY 03/21/18 03/21/18 03/20/18 History


 


Calcium Carbonate/Vitamin D3 2 each PO DAILY 03/21/18 03/21/18 03/20/18 History





[Calcium 600-Vit D3 500 Softgel]     


 


Cyanocobalamin [Vitamin B-12] 1,000 mcg PO DAILY 03/21/18 03/21/18 03/20/18 

History


 


Dulaglutide [Trulicity] 1.5 mg SQ QWEEK 03/21/18 03/21/18 03/20/18 History


 


Ergocalciferol (Vitamin D2) 2,000 unit PO BID 03/21/18 03/21/18 Unknown History





[Vitamin D2]     


 


Ezetimibe/Simvastatin (Nf) 1 tab PO QHS 03/21/18 03/21/18 03/20/18 History





[Vytorin 10-40 mg (Nf)]     


 


Furosemide [Lasix TAB] 40 mg PO BID 03/21/18 03/21/18 03/20/18 History


 


Glimepiride [Amaryl] 4 mg PO QAM 03/21/18 03/21/18 03/20/18 History


 


Glucosamine/D3/Boswellia Kajal 1 each PO DAILY 03/21/18 03/21/18 03/20/18 

History





[Osteo Bi-Flex Tablet]     


 


Insulin Regular, Human [HumuLIN R] 0 unit SQ ACHS 03/21/18 03/21/18 03/20/18 

History


 


Linaclotide [Linzess] 145 mcg PO QDAY 03/21/18 03/21/18 03/20/18 History


 


Lisinopril [Zestril TAB] 40 mg PO QDAY 03/21/18 03/21/18 03/20/18 History


 


Multivit-Min/FA/Lycopen/Lutein 1 each PO DAILY 03/21/18 03/21/18 03/20/18 

History





[Adults 50+ Multivitamin Tablet]     


 


Naproxen Sodium [Aleve] 220 mg PO BID 03/21/18 03/21/18 03/20/18 History


 


Omega-3/Dha/Epa/Fish Oil [Fish Oil 1 each PO DAILY 03/21/18 03/21/18 03/20/18 

History





1,200 mg Softgel]     


 


Potassium Chloride [Klor-Con M20] 20 meq PO DAILY 03/21/18 03/21/18 03/20/18 

History


 


Zinc Acetate [Galzin] 50 mg PO 4XW 03/21/18 03/21/18 03/20/18 History











Active Meds: 


Active Medications





Piperacillin Sod/Tazobactam Sod (Zosyn/Ns 4.5gm/100ml)  4.5 gm in 100 mls @ 200 

mls/hr IV ONCE THEA


   Last Admin: 03/21/18 12:47 Dose:  200 mls/hr


Gentamicin Sulfate 160 mg/ (Sodium Chloride)  104 mls @ 200 mls/hr IV ONCE ONE; 

Protocol


   Stop: 03/21/18 14:07


Ceftriaxone Sodium (Rocephin/Ns 1 Gm/50 Ml)  1 gm in 50 mls @ 100 mls/hr IV 

ONCE ONE; Protocol


   Stop: 03/21/18 14:06











Review of Systems


Constitutional: fever, fatigue, weakness


Ears, nose, mouth and throat: no ear pain, no ear discharge, no tinnitis, no 

decreased hearing, no nose pain


Cardiovascular: no chest pain, no orthopnea, no palpitations, no rapid/

irregular heart beat, no edema


Respiratory: no cough, no cough with sputum, no excessive sputum, no hemoptysis


Gastrointestinal: nausea, vomiting


Genitourinary Male: hematuria, urinary frequency, urinary hesitancy, kidney 

stones, no discharge, no genital sores


Rectal: no pain, no incontinence, no bleeding


Musculoskeletal: no neck stiffness, no neck pain, no shooting arm pain, no arm 

numbness/tingling, no low back pain, no shooting leg pain


Integumentary: no rash, no pruritis, no redness, no sores, no wounds


Neurological: no head injury, no transient paralysis, no paralysis, no weakness

, no parathesias, no numbness, no tingling, no seizures


Psychiatric: no anxiety, no memory loss, no change in sleep habits, no sleep 

disturbances, no insomnia, no hypersomnia, no change in appetite, no change in 

libido


Endocrine: no cold intolerance, no heat intolerance, no polyphagia, no 

excessive thirst, no polydipsia, no polyuria, no nocturia, no excessive sweating


Hematologic/Lymphatic: no easy bruising, no easy bleeding, no lymphadenopathy, 

no lymphedema


Allergic/Immunologic: no urticaria, no allergic rhinitis, no wheezing, no 

persistent infections, no anaphylaxis, no angioedema





Exam





- Constitutional


Vitals: 


 











Temp Pulse Resp BP Pulse Ox


 


 99.0 F   112 H  26 H  79/50   94 


 


 03/21/18 11:14  03/21/18 11:45  03/21/18 11:45  03/21/18 11:14  03/21/18 11:14











General appearance: Present: severe distress





- EENT


Eyes: Present: PERRL


ENT: hearing intact, clear oral mucosa





- Neck


Neck: Present: supple, normal ROM





- Respiratory


Respiratory effort: normal


Respiratory: bilateral: CTA





- Extremities


Extremities: pulses symmetrical, No edema


Peripheral Pulses: abnormal (capillary refill greater than 3.6 seconds)





- Abdominal


General gastrointestinal: Present: soft, non-tender, non-distended, normal 

bowel sounds


Male genitourinary: Present: normal





- Integumentary


Integumentary: Present: clear, dry, clammy, decreased turgor





- Musculoskeletal


Musculoskeletal: generalized weakness





- Psychiatric


Psychiatric: appropriate mood/affect, intact judgment & insight





- Neurologic


Neurologic: CNII-XII intact, moves all extremities





Results





- Labs


CBC & Chem 7: 


 03/21/18 11:27





 03/21/18 11:27


Labs: 


 Abnormal lab results











  03/21/18 03/21/18 03/21/18 Range/Units





  11:27 11:27 11:27 


 


WBC  12.1 H    (4.5-11.0)  K/mm3


 


RBC  5.37 H    (3.65-5.03)  M/mm3


 


Hgb  16.3 H    (11.8-15.2)  gm/dl


 


Hct  48.7 H    (35.5-45.6)  %


 


RDW  15.7 H    (13.2-15.2)  %


 


Plt Count  136 L    (140-440)  K/mm3


 


Lymph % (Auto)  6.5 L    (13.4-35.0)  %


 


Mono % (Auto)  14.7 H    (0.0-7.3)  %


 


Lymph #  0.8 L    (1.2-5.4)  K/mm3


 


Mono #  1.8 H    (0.0-0.8)  K/mm3


 


Seg Neutrophils %  78.3 H    (40.0-70.0)  %


 


Seg Neutrophils #  9.5 H    (1.8-7.7)  K/mm3


 


PT   20.5 H   (12.2-14.9)  Sec.


 


INR   1.65 H   (0.87-1.13)  


 


APTT   40.2 H   (24.2-36.6)  Sec.


 


VBG pH     (7.320-7.420)  


 


Sodium    133 L  (137-145)  mmol/L


 


Chloride    93.9 L  ()  mmol/L


 


Carbon Dioxide    20 L  (22-30)  mmol/L


 


BUN    79 H  (9-20)  mg/dL


 


Creatinine    3.8 H  (0.8-1.5)  mg/dL


 


Glucose    153 H  ()  mg/dL


 


Lactic Acid     (0.7-2.0)  mmol/L


 


AST    75 H  (5-40)  units/L


 


ALT    58 H  (7-56)  units/L


 


Alkaline Phosphatase    142 H  ()  units/L


 


Albumin    2.8 L  (3.9-5)  g/dL














  03/21/18 03/21/18 Range/Units





  11:27 11:27 


 


WBC    (4.5-11.0)  K/mm3


 


RBC    (3.65-5.03)  M/mm3


 


Hgb    (11.8-15.2)  gm/dl


 


Hct    (35.5-45.6)  %


 


RDW    (13.2-15.2)  %


 


Plt Count    (140-440)  K/mm3


 


Lymph % (Auto)    (13.4-35.0)  %


 


Mono % (Auto)    (0.0-7.3)  %


 


Lymph #    (1.2-5.4)  K/mm3


 


Mono #    (0.0-0.8)  K/mm3


 


Seg Neutrophils %    (40.0-70.0)  %


 


Seg Neutrophils #    (1.8-7.7)  K/mm3


 


PT    (12.2-14.9)  Sec.


 


INR    (0.87-1.13)  


 


APTT    (24.2-36.6)  Sec.


 


VBG pH   7.316 L  (7.320-7.420)  


 


Sodium    (137-145)  mmol/L


 


Chloride    ()  mmol/L


 


Carbon Dioxide    (22-30)  mmol/L


 


BUN    (9-20)  mg/dL


 


Creatinine    (0.8-1.5)  mg/dL


 


Glucose    ()  mg/dL


 


Lactic Acid  2.30 H*   (0.7-2.0)  mmol/L


 


AST    (5-40)  units/L


 


ALT    (7-56)  units/L


 


Alkaline Phosphatase    ()  units/L


 


Albumin    (3.9-5)  g/dL














Assessment and Plan





- Patient Problems


(1) Sepsis


Current Visit: Yes   Status: Acute   


Qualifiers: 


   Sepsis type: sepsis due to unspecified organism   Qualified Code(s): A41.9 - 

Sepsis, unspecified organism   


Plan to address problem: 


IV antibiotics, IVF resuscitation, monitor uop q shift, serial lactic acid, 

urinalysis, blood cultures, repeat CBC and BMP





The high probability of a clinically significant, sudden or life threatening 

deterioration of the [cardiac, renal, neuro] system(s) required my full and 

direct attention, intervention and personal management. The aggregate critical 

care time was [65] minutes. This time is in addition to time spent performing 

reported procedures but includes the following: 





[x] Data Review and interpretation 





[x] Patient assessment and monitoring of vital signs 





[x] Documentation 





[x] Medication orders and management








(2) Metabolic acidosis


Current Visit: Yes   Status: Acute   


Plan to address problem: 


IVF resuscitation, serial bmp, repeat lactic acid level








(3) Atrial fibrillation


Current Visit: Yes   Status: Acute   


Qualifiers: 


   Atrial fibrillation type: chronic   Qualified Code(s): I48.2 - Chronic 

atrial fibrillation   


Plan to address problem: 


resume anticoagulation as soon as possible postoperatively. 








(4) Acute on chronic renal failure


Current Visit: Yes   Status: Acute   


Qualifiers: 


   Acute renal failure type: with acute tubular necrosis 


Plan to address problem: 


IVF resuscitation, treat obstructive uropathy, repeat bmp to assess serial 

serum creatnine, monitor uop q shift, 








(5) Obstructive uropathy


Current Visit: Yes   Status: Acute   


Plan to address problem: 


Urology consulted, Pt taken urgently to OR for intervention








(6) DVT prophylaxis


Current Visit: Yes   Status: Acute   


Plan to address problem: 


SCD to BLE while in bed.

## 2018-03-21 NOTE — EVENT NOTE
Date: 03/21/18





Was asked to see patient asked to see patient in the event that cystoscopy is 

unsuccessful in passing a stent on the right side.  Had a lengthy discussion 

with the patient regarding the very high risks associated with percutaneous 

nephrostomy tube placement as the patient is morbidly obese, septic and on the 

Eliquis for Afib.  The patient understands and written consent was obtained.

## 2018-03-21 NOTE — POST OPERATIVE NOTE
Date of procedure: 03/21/18


Pre-op diagnosis: sepsis r ureteral stone 


Post-op diagnosis: same


Findings: 





stone stricture 


Procedure: 





cysto dviu stent 


Anesthesia: GETA


Surgeon: ZOILA MCNEILL


Estimated blood loss: minimal


Pathology: list (c and s)


Specimen disposition: to lab


Condition: stable


Disposition: PACU

## 2018-03-22 LAB
BUN SERPL-MCNC: 61 MG/DL (ref 9–20)
BUN/CREAT SERPL: 24 %
CALCIUM SERPL-MCNC: 8.6 MG/DL (ref 8.4–10.2)
HEMOLYSIS INDEX: 6

## 2018-03-22 PROCEDURE — 5A09457 ASSISTANCE WITH RESPIRATORY VENTILATION, 24-96 CONSECUTIVE HOURS, CONTINUOUS POSITIVE AIRWAY PRESSURE: ICD-10-PCS | Performed by: INTERNAL MEDICINE

## 2018-03-22 RX ADMIN — Medication SCH ML: at 10:55

## 2018-03-22 RX ADMIN — POTASSIUM CHLORIDE SCH MEQ: 1500 TABLET, EXTENDED RELEASE ORAL at 10:55

## 2018-03-22 RX ADMIN — SODIUM CHLORIDE SCH MLS/HR: 0.9 INJECTION, SOLUTION INTRAVENOUS at 01:57

## 2018-03-22 RX ADMIN — DILTIAZEM HYDROCHLORIDE SCH MG: 60 TABLET, FILM COATED ORAL at 19:45

## 2018-03-22 RX ADMIN — EZETIMIBE SCH MG: 10 TABLET ORAL at 22:31

## 2018-03-22 RX ADMIN — Medication SCH MCG: at 10:55

## 2018-03-22 RX ADMIN — SODIUM CHLORIDE SCH MLS/HR: 0.9 INJECTION, SOLUTION INTRAVENOUS at 17:37

## 2018-03-22 RX ADMIN — OMEGA-3 FATTY ACIDS CAP 1000 MG SCH MG: 1000 CAP at 14:54

## 2018-03-22 RX ADMIN — PRAVASTATIN SODIUM SCH MG: 80 TABLET ORAL at 22:30

## 2018-03-22 RX ADMIN — LISINOPRIL SCH: 40 TABLET ORAL at 11:53

## 2018-03-22 RX ADMIN — OXYCODONE HYDROCHLORIDE AND ACETAMINOPHEN SCH MG: 500 TABLET ORAL at 10:55

## 2018-03-22 RX ADMIN — ALLOPURINOL SCH MG: 100 TABLET ORAL at 10:55

## 2018-03-22 RX ADMIN — ALBUTEROL SULFATE PRN MG: 2.5 SOLUTION RESPIRATORY (INHALATION) at 08:48

## 2018-03-22 RX ADMIN — DILTIAZEM HYDROCHLORIDE SCH MG: 60 TABLET, FILM COATED ORAL at 14:53

## 2018-03-22 RX ADMIN — FUROSEMIDE SCH MG: 40 TABLET ORAL at 17:36

## 2018-03-22 RX ADMIN — OXYCODONE HYDROCHLORIDE AND ACETAMINOPHEN SCH MG: 500 TABLET ORAL at 22:30

## 2018-03-22 RX ADMIN — Medication SCH EACH: at 14:54

## 2018-03-22 RX ADMIN — MULTIPLE VITAMINS W/ MINERALS TAB SCH EACH: TAB at 10:55

## 2018-03-22 RX ADMIN — FUROSEMIDE SCH MG: 40 TABLET ORAL at 06:05

## 2018-03-22 NOTE — CONSULTATION
History of Present Illness


Consult date: 03/22/18


Consult reason: atrial fibrillation


History of present illness: 





Mr Ha is a 72yr old male who was sent to the ED from his urologist office 

for evaluation. He was found to have bilateral nephrolithiasis with an 

obstructing right ureter stone on his initial workup in the ED. Patient is now 

in the CCU and is status post right ureteral stent. He denies chest pain and 

shortness of breath. An ECG is rapid atrial fibrillation, rate 117. A cardiac 

consultation is requested for management of atrial fibrillation.





Patient has a history of chronic atrial fibrillation and is on eliquis for oral 

anticoagulation. There is no history of coronary artery disease. In 2105, he 

underwent a cardiac cath that reports no significant CAD, normal left 

ventricular ejection fraction. 








Past History


Past Medical History: atrial fib, diabetes, hypertension, other (Morbid Obesity)


Past Surgical History: No surgical history, Other (reviewed)


Social history: .  denies: smoking, alcohol abuse, prescription drug 

abuse, IV drug use


Family history: no significant family history (reviewed)





Medications and Allergies


 Allergies











Allergy/AdvReac Type Severity Reaction Status Date / Time


 


No Known Allergies Allergy   Verified 03/21/18 11:29











 Home Medications











 Medication  Instructions  Recorded  Confirmed  Last Taken  Type


 


ALBUTEROL Inhaler [Proair] 2 puff IH QID PRN 03/21/18 03/21/18 Unknown History


 


Allopurinol [Zyloprim] 300 mg PO QDAY 03/21/18 03/21/18 03/20/18 History


 


Apixaban [Eliquis] 5 mg PO BID 03/21/18 03/21/18 03/20/18 History


 


Ascorbic Acid [Vitamin C] 500 mg PO BID 03/21/18 03/21/18 03/20/18 History


 


Aspirin [Lo-Dose Aspirin EC] 81 mg PO DAILY 03/21/18 03/21/18 03/20/18 History


 


Calcium Carbonate/Vitamin D3 2 each PO DAILY 03/21/18 03/21/18 03/20/18 History





[Calcium 600-Vit D3 500 Softgel]     


 


Cyanocobalamin [Vitamin B-12] 1,000 mcg PO DAILY 03/21/18 03/21/18 03/20/18 

History


 


Dulaglutide [Trulicity] 1.5 mg SQ QWEEK 03/21/18 03/21/18 03/20/18 History


 


Ergocalciferol (Vitamin D2) 2,000 unit PO BID 03/21/18 03/21/18 Unknown History





[Vitamin D2]     


 


Ezetimibe/Simvastatin (Nf) 1 tab PO QHS 03/21/18 03/21/18 03/20/18 History





[Vytorin 10-40 mg (Nf)]     


 


Furosemide [Lasix TAB] 40 mg PO BID 03/21/18 03/21/18 03/20/18 History


 


Glimepiride [Amaryl] 4 mg PO QAM 03/21/18 03/21/18 03/20/18 History


 


Glucosamine/D3/Boswellia Kajal 1 each PO DAILY 03/21/18 03/21/18 03/20/18 

History





[Osteo Bi-Flex Tablet]     


 


Insulin Regular, Human [HumuLIN R] 0 unit SQ ACHS 03/21/18 03/21/18 03/20/18 

History


 


Linaclotide [Linzess] 145 mcg PO QDAY 03/21/18 03/21/18 03/20/18 History


 


Lisinopril [Zestril TAB] 40 mg PO QDAY 03/21/18 03/21/18 03/20/18 History


 


Multivit-Min/FA/Lycopen/Lutein 1 each PO DAILY 03/21/18 03/21/18 03/20/18 

History





[Adults 50+ Multivitamin Tablet]     


 


Naproxen Sodium [Aleve] 220 mg PO BID 03/21/18 03/21/18 03/20/18 History


 


Omega-3/Dha/Epa/Fish Oil [Fish Oil 1 each PO DAILY 03/21/18 03/21/18 03/20/18 

History





1,200 mg Softgel]     


 


Potassium Chloride [Klor-Con M20] 20 meq PO DAILY 03/21/18 03/21/18 03/20/18 

History


 


Zinc Acetate [Galzin] 50 mg PO 4XW 03/21/18 03/21/18 03/20/18 History











Active Meds: 


Active Medications





Albuterol (Proventil)  2.5 mg IH QIDRT PRN


   PRN Reason: Shortness Of Breath


   Last Admin: 03/22/18 08:48 Dose:  2.5 mg


Allopurinol (Zyloprim)  100 mg PO QDAY THEA


   Last Admin: 03/22/18 10:55 Dose:  100 mg


Ascorbic Acid (Vitamin C)  500 mg PO BID [facility]


   Last Admin: 03/22/18 10:55 Dose:  500 mg


Calcium/Vitamin D (Oysco D 500 Mg-200 Unit)  2 each PO DAILY [facility]


Cyanocobalamin (Vitamin B-12)  1,000 mcg PO DAILY [facility]


   Last Admin: 03/22/18 10:55 Dose:  1,000 mcg


Dextrose (D50w (25gm) Syringe)  50 ml IV PRN PRN


   PRN Reason: Hypoglycemia


Ezetimibe (Zetia)  10 mg PO QHS [facility]


   Last Admin: 03/21/18 23:52 Dose:  10 mg


Fish Oil (Fish Oil)  1,000 mg PO QDAY [facility]


Furosemide (Lasix)  40 mg PO 0600,1800 [facility]


   Last Admin: 03/22/18 06:05 Dose:  40 mg


Phenylephrine HCl 100 mg/ (Sodium Chloride)  100 mls @ 3 mls/hr IV TITR [facility]; 

Protocol


   Last Titration: 03/21/18 23:56 Dose:  0 mcg/min, 0 mls/hr


Sodium Chloride (Nacl 0.9% 1000 Ml)  1,000 mls @ 100 mls/hr IV AS DIRECT [facility]


   Last Admin: 03/22/18 01:57 Dose:  100 mls/hr


Insulin Human Regular (Humulin R)  0 units SUB-Q Q6HR [facility]; Protocol


   Last Admin: 03/22/18 11:48 Dose:  Not Given


Lisinopril (Zestril)  40 mg PO QDAY [facility]


   Last Admin: 03/22/18 11:53 Dose:  Not Given


Miscellaneous Medication (Linaclotide [Linzess])  145 mcg PO QDAY [facility]


Multivitamins/Minerals (Theragran-M Tab)  1 each PO QDAY [facility]


   Last Admin: 03/22/18 10:55 Dose:  1 each


Potassium Chloride (K-Dur)  20 meq PO DAILY [facility]


   Last Admin: 03/22/18 10:55 Dose:  20 meq


Pravastatin Sodium (Pravachol)  80 mg PO QHS [facility]


   Last Admin: 03/21/18 23:52 Dose:  80 mg


Sodium Chloride (Sodium Chloride Flush Syringe 10 Ml)  10 ml IV BID [facility]


   Last Admin: 03/22/18 10:55 Dose:  10 ml


Sodium Chloride (Sodium Chloride Flush Syringe 10 Ml)  10 ml IV PRN PRN


   PRN Reason: LINE FLUSH











Physical Examination


 Vital Signs











Temp Pulse Resp BP Pulse Ox


 


 99.0 F   136 H  20   79/50   94 


 


 03/21/18 11:14  03/21/18 11:14  03/21/18 11:14  03/21/18 11:14  03/21/18 11:14











General appearance: no acute distress


HEENT: Positive: PERRL


Cardiac: Positive: irregularly irregular





Results





 03/21/18 16:29





 03/21/18 20:49


 Coagulation











  03/21/18 Range/Units





  16:29 


 


PT  19.9 H  (12.2-14.9)  Sec.


 


INR  1.59 H  (0.87-1.13)  


 


APTT  43.9 H  (24.2-36.6)  Sec.








 CBC











  03/21/18 Range/Units





  16:29 


 


WBC  11.7 H  (4.5-11.0)  K/mm3


 


RBC  4.67  (3.65-5.03)  M/mm3


 


Hgb  14.2  (11.8-15.2)  gm/dl


 


Hct  43.3  (35.5-45.6)  %


 


Plt Count  121 L  (140-440)  K/mm3








 Comprehensive Metabolic Panel











  03/21/18 03/21/18 03/21/18 Range/Units





  11:27 17:22 20:49 


 


Sodium   139  135 L  (137-145)  mmol/L


 


Potassium   3.7  4.7  D  (3.6-5.0)  mmol/L


 


Chloride   102.0  99.2  ()  mmol/L


 


Carbon Dioxide   18 L  15 L  (22-30)  mmol/L


 


BUN   70 H  70 H  (9-20)  mg/dL


 


Creatinine   3.3 H  3.1 H  (0.8-1.5)  mg/dL


 


Glucose   90  108 H  ()  mg/dL


 


Calcium   8.1 L  8.6  (8.4-10.2)  mg/dL


 


ALT  58 H    (7-56)  units/L


 


Alkaline Phosphatase  142 H    ()  units/L


 


Albumin  2.8 L    (3.9-5)  g/dL














Assessment and Plan





- Patient Problems


(1) Chronic atrial fibrillation


Current Visit: Yes   Status: Acute

## 2018-03-22 NOTE — CONSULTATION
History of Present Illness





- Reason for Consult


Consult date: 03/22/18





- History of Present Illness





Mr. Ha is a 73yo w/ history of recurrent nephrolithiasis recently treated 

for a UTI who presented to the ED with a 1-2 week history of fatigue, nausea, 

poor po intake.  Upon arrival to the ED, patient was hypotensive with low grade 

fever.





CT was notable for 1.4cm distal right ureteral stone w/ mod right 

hydronephrosis.  





Patient's urologist was consulted.  He was taken to the OR emergently. Patient 

is now s/p cystoscopy, uretrotomy and stent. 





Nephrology consultation requested for NATALIE





Past History


Past Medical History: atrial fib, diabetes, hypertension, other (Morbid Obesity)


Past Surgical History: No surgical history, Other (reviewed)


Social history: .  denies: smoking, alcohol abuse, prescription drug 

abuse, IV drug use


Family history: no significant family history (reviewed)





Medications and Allergies


 Allergies











Allergy/AdvReac Type Severity Reaction Status Date / Time


 


No Known Allergies Allergy   Verified 03/21/18 11:29











 Home Medications











 Medication  Instructions  Recorded  Confirmed  Last Taken  Type


 


ALBUTEROL Inhaler [Proair] 2 puff IH QID PRN 03/21/18 03/21/18 Unknown History


 


Allopurinol [Zyloprim] 300 mg PO QDAY 03/21/18 03/21/18 03/20/18 History


 


Apixaban [Eliquis] 5 mg PO BID 03/21/18 03/21/18 03/20/18 History


 


Ascorbic Acid [Vitamin C] 500 mg PO BID 03/21/18 03/21/18 03/20/18 History


 


Aspirin [Lo-Dose Aspirin EC] 81 mg PO DAILY 03/21/18 03/21/18 03/20/18 History


 


Calcium Carbonate/Vitamin D3 2 each PO DAILY 03/21/18 03/21/18 03/20/18 History





[Calcium 600-Vit D3 500 Softgel]     


 


Cyanocobalamin [Vitamin B-12] 1,000 mcg PO DAILY 03/21/18 03/21/18 03/20/18 

History


 


Dulaglutide [Trulicity] 1.5 mg SQ QWEEK 03/21/18 03/21/18 03/20/18 History


 


Ergocalciferol (Vitamin D2) 2,000 unit PO BID 03/21/18 03/21/18 Unknown History





[Vitamin D2]     


 


Ezetimibe/Simvastatin (Nf) 1 tab PO QHS 03/21/18 03/21/18 03/20/18 History





[Vytorin 10-40 mg (Nf)]     


 


Furosemide [Lasix TAB] 40 mg PO BID 03/21/18 03/21/18 03/20/18 History


 


Glimepiride [Amaryl] 4 mg PO QAM 03/21/18 03/21/18 03/20/18 History


 


Glucosamine/D3/Boswellia Kajal 1 each PO DAILY 03/21/18 03/21/18 03/20/18 

History





[Osteo Bi-Flex Tablet]     


 


Insulin Regular, Human [HumuLIN R] 0 unit SQ ACHS 03/21/18 03/21/18 03/20/18 

History


 


Linaclotide [Linzess] 145 mcg PO QDAY 03/21/18 03/21/18 03/20/18 History


 


Lisinopril [Zestril TAB] 40 mg PO QDAY 03/21/18 03/21/18 03/20/18 History


 


Multivit-Min/FA/Lycopen/Lutein 1 each PO DAILY 03/21/18 03/21/18 03/20/18 

History





[Adults 50+ Multivitamin Tablet]     


 


Naproxen Sodium [Aleve] 220 mg PO BID 03/21/18 03/21/18 03/20/18 History


 


Omega-3/Dha/Epa/Fish Oil [Fish Oil 1 each PO DAILY 03/21/18 03/21/18 03/20/18 

History





1,200 mg Softgel]     


 


Potassium Chloride [Klor-Con M20] 20 meq PO DAILY 03/21/18 03/21/18 03/20/18 

History


 


Zinc Acetate [Galzin] 50 mg PO 4XW 03/21/18 03/21/18 03/20/18 History











Active Meds: 


Active Medications





Albuterol (Proventil)  2.5 mg IH QIDRT PRN


   PRN Reason: Shortness Of Breath


   Last Admin: 03/22/18 08:48 Dose:  2.5 mg


Allopurinol (Zyloprim)  100 mg PO QDAY [facility]


   Last Admin: 03/22/18 10:55 Dose:  100 mg


Ascorbic Acid (Vitamin C)  500 mg PO BID [facility]


   Last Admin: 03/22/18 10:55 Dose:  500 mg


Calcium/Vitamin D (Oysco D 500 Mg-200 Unit)  2 each PO DAILY [facility]


Cyanocobalamin (Vitamin B-12)  1,000 mcg PO DAILY [facility]


   Last Admin: 03/22/18 10:55 Dose:  1,000 mcg


Dextrose (D50w (25gm) Syringe)  50 ml IV PRN PRN


   PRN Reason: Hypoglycemia


Ezetimibe (Zetia)  10 mg PO QHS [facility]


   Last Admin: 03/21/18 23:52 Dose:  10 mg


Fish Oil (Fish Oil)  1,000 mg PO QDAY [facility]


Furosemide (Lasix)  40 mg PO 0600,1800 [facility]


   Last Admin: 03/22/18 06:05 Dose:  40 mg


Phenylephrine HCl 100 mg/ (Sodium Chloride)  100 mls @ 3 mls/hr IV TITR [facility]; 

Protocol


   Last Titration: 03/21/18 23:56 Dose:  0 mcg/min, 0 mls/hr


Sodium Chloride (Nacl 0.9% 1000 Ml)  1,000 mls @ 100 mls/hr IV AS DIRECT [facility]


   Last Admin: 03/22/18 01:57 Dose:  100 mls/hr


Insulin Human Regular (Humulin R)  0 units SUB-Q Q6HR [facility]; Protocol


   Last Admin: 03/22/18 11:48 Dose:  Not Given


Lisinopril (Zestril)  40 mg PO QDAY [facility]


   Last Admin: 03/22/18 11:53 Dose:  Not Given


Miscellaneous Medication (Linaclotide [Linzess])  145 mcg PO QDAY [facility]


Multivitamins/Minerals (Theragran-M Tab)  1 each PO QDAY [facility]


   Last Admin: 03/22/18 10:55 Dose:  1 each


Potassium Chloride (K-Dur)  20 meq PO DAILY [facility]


   Last Admin: 03/22/18 10:55 Dose:  20 meq


Pravastatin Sodium (Pravachol)  80 mg PO QHS [facility]


   Last Admin: 03/21/18 23:52 Dose:  80 mg


Sodium Chloride (Sodium Chloride Flush Syringe 10 Ml)  10 ml IV BID [facility]


   Last Admin: 03/22/18 10:55 Dose:  10 ml


Sodium Chloride (Sodium Chloride Flush Syringe 10 Ml)  10 ml IV PRN PRN


   PRN Reason: LINE FLUSH











Review of Systems


All systems: negative


Constitutional: fatigue, weakness


Cardiovascular: edema, shortness of breath


Gastrointestinal: nausea, no vomiting


Musculoskeletal: low back pain (right)





Exam





- Vital Signs


Vital signs: 


 Vital Signs











Temp Pulse Resp BP Pulse Ox


 


 99.0 F   136 H  20   79/50   94 


 


 03/21/18 11:14  03/21/18 11:14  03/21/18 11:14  03/21/18 11:14  03/21/18 11:14














- General Appearance


General appearance: well-developed, well-nourished


EENT: ATNC


Respiratory: Clear to Ascultation


Heart: irregular


Gastrointestinal: Present: obese, other (no CVA tenderness).  Absent: tenderness

, distended


Integumentary: warm and dry


Neurologic: no focal deficit, alert and oriented x3


Musculoskeletal: Present: other (+edema)


Psychiatric: cooperative





Results





- Lab Results





 03/21/18 16:29





 03/21/18 20:49


 Most recent lab results











Calcium  8.6 mg/dL (8.4-10.2)   03/21/18  20:49    














Assessment and Plan





Impression:


* Acute kidney injury secondary to multifactorial etiologies:  sepsis related 

ATN vs obstructive uropathy


* Right ureteral stone w/ obstruction, right hydronephrosis; s/p cystoscopy, 

urethrotomy and stent.


* Sepsis


* Atrial fibrillation w/ RVR


* Chronic anticoagulation


* Metabolic acidosis





Plan:


* No acute indication for renal replacement therapy


* Obtain BMP


* Abx per primary team


* Rate control per cardiology


* Urology following


* Avoid nephrotoxins


* Dose medications for renal function

## 2018-03-22 NOTE — PROGRESS NOTE
Assessment and Plan


Assessment and plan: 





Sepsis.  Continue IV antibiotics and IV fluid hydration.  Follow-up serial 

lactic acid levels.  Follow-up UA, urine and blood cultures.





Paroxysmal Atrial fibrillation with RVR.  Cardiology consultation.  Patient not 

on any medications for rate control.  Check echocardiogram.  Patient previously 

on eliquis.





Acute on chronic renal failure.  Etiology is multifactorial secondary to acute 

kidney injury from sepsis/ATN/vasomotor nephropathy/volume depletion and 

obstructive uropathy from ureteral stone/stricture.  Continue IV fluid 

hydration and follow BMP closely.  Nephrology consultation.





Obstructive uropathy/hydronephrosis.  Etiology secondary to Right ureteral stone

/stricture.  Patient is status post cystoscopic stent placement.





Diabetes mellitus type 2.  Continue Accu-Cheks and sliding scale insulin.





Hypertension.  Resume antihypertensive medications.














History


Interval history: 





No new issues overnight.





Hospitalist Physical





- Constitutional


Vitals: 


 











Temp Pulse Resp BP Pulse Ox


 


 98.3 F   130 H  22   128/67   95 


 


 03/22/18 08:00  03/22/18 09:15  03/22/18 09:15  03/22/18 09:15  03/22/18 09:15











General appearance: Present: no acute distress





- EENT


Eyes: Present: PERRL, EOM intact


ENT: hearing intact, clear oral mucosa, dentition normal





- Neck


Neck: Present: supple, normal ROM





- Respiratory


Respiratory effort: normal


Respiratory: bilateral: CTA





- Cardiovascular


Rhythm: irregularly irregular


Heart Sounds: Absent: gallop, rub





- Extremities


Extremities: no ischemia, No edema, Full ROM





- Abdominal


General gastrointestinal: soft, non-tender, non-distended, normal bowel sounds





- Integumentary


Integumentary: Present: clear, warm, dry





- Neurologic


Neurologic: CNII-XII intact, moves all extremities





Results





- Labs


CBC & Chem 7: 


 03/21/18 16:29





 03/21/18 20:49


Labs: 


 Laboratory Last Values











WBC  11.7 K/mm3 (4.5-11.0)  H  03/21/18  16:29    


 


RBC  4.67 M/mm3 (3.65-5.03)   03/21/18  16:29    


 


Hgb  14.2 gm/dl (11.8-15.2)   03/21/18  16:29    


 


Hct  43.3 % (35.5-45.6)   03/21/18  16:29    


 


MCV  93 fl (84-94)   03/21/18  16:29    


 


MCH  30 pg (28-32)   03/21/18  16:29    


 


MCHC  33 % (32-34)   03/21/18  16:29    


 


RDW  15.9 % (13.2-15.2)  H  03/21/18  16:29    


 


Plt Count  121 K/mm3 (140-440)  L  03/21/18  16:29    


 


Lymph % (Auto)  6.5 % (13.4-35.0)  L  03/21/18  11:27    


 


Mono % (Auto)  Np   03/21/18  16:29    


 


Eos % (Auto)  0.2 % (0.0-4.3)   03/21/18  11:27    


 


Baso % (Auto)  0.3 % (0.0-1.8)   03/21/18  11:27    


 


Lymph #  0.8 K/mm3 (1.2-5.4)  L  03/21/18  11:27    


 


Mono #  1.8 K/mm3 (0.0-0.8)  H  03/21/18  11:27    


 


Eos #  0.0 K/mm3 (0.0-0.4)   03/21/18  11:27    


 


Baso #  0.0 K/mm3 (0.0-0.1)   03/21/18  11:27    


 


Add Manual Diff  Complete   03/21/18  16:29    


 


Total Counted  100   03/21/18  16:29    


 


Seg Neutrophils %  78.3 % (40.0-70.0)  H  03/21/18  11:27    


 


Seg Neuts % (Manual)  70.0 % (40.0-70.0)   03/21/18  16:29    


 


Band Neutrophils %  4.0 %  03/21/18  16:29    


 


Lymphocytes % (Manual)  5.0 % (13.4-35.0)  L  03/21/18  16:29    


 


Reactive Lymphs % (Man)  0 %  03/21/18  16:29    


 


Monocytes % (Manual)  19.0 % (0.0-7.3)  H  03/21/18  16:29    


 


Eosinophils % (Manual)  0 % (0.0-4.3)   03/21/18  16:29    


 


Basophils % (Manual)  0 % (0.0-1.8)   03/21/18  16:29    


 


Metamyelocytes %  0 %  03/21/18  16:29    


 


Myelocytes %  2.0 %  03/21/18  16:29    


 


Promyelocytes %  0 %  03/21/18  16:29    


 


Blast Cells %  0 %  03/21/18  16:29    


 


Nucleated RBC %  Not Reportable   03/21/18  16:29    


 


Seg Neutrophils #  9.5 K/mm3 (1.8-7.7)  H  03/21/18  11:27    


 


Seg Neutrophils # Man  8.2 K/mm3 (1.8-7.7)  H  03/21/18  16:29    


 


Band Neutrophils #  0.5 K/mm3  03/21/18  16:29    


 


Lymphocytes # (Manual)  0.6 K/mm3 (1.2-5.4)  L  03/21/18  16:29    


 


Abs React Lymphs (Man)  0.0 K/mm3  03/21/18  16:29    


 


Monocytes # (Manual)  2.2 K/mm3 (0.0-0.8)  H  03/21/18  16:29    


 


Eosinophils # (Manual)  0.0 K/mm3 (0.0-0.4)   03/21/18  16:29    


 


Basophils # (Manual)  0.0 K/mm3 (0.0-0.1)   03/21/18  16:29    


 


Metamyelocytes #  0.0 K/mm3  03/21/18  16:29    


 


Myelocytes #  0.2 K/mm3  03/21/18  16:29    


 


Promyelocytes #  0.0 K/mm3  03/21/18  16:29    


 


Blast Cells #  0.0 K/mm3  03/21/18  16:29    


 


WBC Morphology  Not Reportable   03/21/18  16:29    


 


Hypersegmented Neuts  Not Reportable   03/21/18  16:29    


 


Hyposegmented Neuts  Not Reportable   03/21/18  16:29    


 


Hypogranular Neuts  Not Reportable   03/21/18  16:29    


 


Smudge Cells  Not Reportable   03/21/18  16:29    


 


Toxic Granulation  Not Reportable   03/21/18  16:29    


 


Toxic Vacuolation  Not Reportable   03/21/18  16:29    


 


Dohle Bodies  Not Reportable   03/21/18  16:29    


 


Pelger-Huet Anomaly  Not Reportable   03/21/18  16:29    


 


Jax Rods  Not Reportable   03/21/18  16:29    


 


Platelet Estimate  Appears normal   03/21/18  16:29    


 


Clumped Platelets  Not Reportable   03/21/18  16:29    


 


Plt Clumps, EDTA  Not Reportable   03/21/18  16:29    


 


Large Platelets  Not Reportable   03/21/18  16:29    


 


Giant Platelets  Not Reportable   03/21/18  16:29    


 


Platelet Satelliting  Not Reportable   03/21/18  16:29    


 


Plt Morphology Comment  Not Reportable   03/21/18  16:29    


 


RBC Morphology  Normal   03/21/18  16:29    


 


Dimorphic RBCs  Not Reportable   03/21/18  16:29    


 


Polychromasia  Not Reportable   03/21/18  16:29    


 


Hypochromasia  Not Reportable   03/21/18  16:29    


 


Poikilocytosis  Not Reportable   03/21/18  16:29    


 


Anisocytosis  Not Reportable   03/21/18  16:29    


 


Microcytosis  Not Reportable   03/21/18  16:29    


 


Macrocytosis  Not Reportable   03/21/18  16:29    


 


Spherocytes  Not Reportable   03/21/18  16:29    


 


Pappenheimer Bodies  Not Reportable   03/21/18  16:29    


 


Sickle Cells  Not Reportable   03/21/18  16:29    


 


Target Cells  Not Reportable   03/21/18  16:29    


 


Tear Drop Cells  Not Reportable   03/21/18  16:29    


 


Ovalocytes  Not Reportable   03/21/18  16:29    


 


Helmet Cells  Not Reportable   03/21/18  16:29    


 


Torres-Aredale Bodies  Not Reportable   03/21/18  16:29    


 


Cabot Rings  Not Reportable   03/21/18  16:29    


 


Calvin Cells  Not Reportable   03/21/18  16:29    


 


Bite Cells  Not Reportable   03/21/18  16:29    


 


Crenated Cell  Not Reportable   03/21/18  16:29    


 


Elliptocytes  Not Reportable   03/21/18  16:29    


 


Acanthocytes (Spur)  Not Reportable   03/21/18  16:29    


 


Rouleaux  Not Reportable   03/21/18  16:29    


 


Hemoglobin C Crystals  Not Reportable   03/21/18  16:29    


 


Schistocytes  Not Reportable   03/21/18  16:29    


 


Malaria parasites  Not Reportable   03/21/18  16:29    


 


Beau Bodies  Not Reportable   03/21/18  16:29    


 


Hem Pathologist Commnt  No   03/21/18  16:29    


 


PT  19.9 Sec. (12.2-14.9)  H  03/21/18  16:29    


 


INR  1.59  (0.87-1.13)  H  03/21/18  16:29    


 


APTT  43.9 Sec. (24.2-36.6)  H  03/21/18  16:29    


 


VBG pH  7.316  (7.320-7.420)  L  03/21/18  11:27    


 


Sodium  135 mmol/L (137-145)  L  03/21/18  20:49    


 


Potassium  4.7 mmol/L (3.6-5.0)  D 03/21/18  20:49    


 


Chloride  99.2 mmol/L ()   03/21/18  20:49    


 


Carbon Dioxide  15 mmol/L (22-30)  L  03/21/18  20:49    


 


Anion Gap  26 mmol/L  03/21/18  20:49    


 


BUN  70 mg/dL (9-20)  H  03/21/18  20:49    


 


Creatinine  3.1 mg/dL (0.8-1.5)  H  03/21/18  20:49    


 


Estimated GFR  20 ml/min  03/21/18  20:49    


 


BUN/Creatinine Ratio  23 %  03/21/18  20:49    


 


Glucose  108 mg/dL ()  H  03/21/18  20:49    


 


POC Glucose  94  ()   03/22/18  05:22    


 


Lactic Acid  2.10 mmol/L (0.7-2.0)  H*  03/21/18  20:49    


 


Calcium  8.6 mg/dL (8.4-10.2)   03/21/18  20:49    


 


Total Bilirubin  0.90 mg/dL (0.1-1.2)   03/21/18  11:27    


 


AST  75 units/L (5-40)  H  03/21/18  11:27    


 


ALT  58 units/L (7-56)  H  03/21/18  11:27    


 


Alkaline Phosphatase  142 units/L ()  H  03/21/18  11:27    


 


C-Reactive Protein  12.40 mg/dL (0.00-1.30)  H  03/21/18  11:27    


 


Total Protein  6.5 g/dL (6.3-8.2)   03/21/18  11:27    


 


Albumin  2.8 g/dL (3.9-5)  L  03/21/18  11:27    


 


Albumin/Globulin Ratio  0.8 %  03/21/18  11:27    


 


Urine Color  Zamzam  (Yellow)   03/21/18  13:49    


 


Urine Turbidity  Clear  (Clear)   03/21/18  13:49    


 


Urine pH  5.0  (5.0-7.0)   03/21/18  13:49    


 


Ur Specific Gravity  1.010  (1.003-1.030)   03/21/18  13:49    


 


Urine Protein  30 mg/dl mg/dL (Negative)   03/21/18  13:49    


 


Urine Glucose (UA)  >=500 mg/dL (Negative)   03/21/18  13:49    


 


Urine Ketones  Neg mg/dL (Negative)   03/21/18  13:49    


 


Urine Blood  Lg  (Negative)   03/21/18  13:49    


 


Urine Nitrite  Neg  (Negative)   03/21/18  13:49    


 


Urine Bilirubin  Neg  (Negative)   03/21/18  13:49    


 


Urine Urobilinogen  < 2.0 mg/dL (<2.0)   03/21/18  13:49    


 


Ur Leukocyte Esterase  Tr  (Negative)   03/21/18  13:49    


 


Urine WBC (Auto)  37.0 /HPF (0.0-6.0)  H  03/21/18  13:49    


 


Urine RBC (Auto)  > 182.0 /HPF (0.0-6.0)   03/21/18  13:49    


 


U Epithel Cells (Auto)  1.0 /HPF (0-13.0)   03/21/18  13:49    


 


Urine Bacteria (Auto)  1+ /HPF (Negative)   03/21/18  13:49    


 


Urine Mucus  Few /HPF  03/21/18  13:49

## 2018-03-22 NOTE — XRAY REPORT
AP ABDOMEN:



HISTORY: Stent placement for right ureteral stone.



7 AP fluoroscopic images of the abdomen were obtained which demonstrate 

right ureteral stent placement by urology. No contrast was injected. 

Cystoscopy was also performed. Correlate with the procedural report as 

needed.



IMPRESSION:

Right ureteral stent placement.

## 2018-03-22 NOTE — PROGRESS NOTE
Assessment and Plan





Severe sepsis with septic shock


Nephrolithiasis with obstructive uropathy


Morbid obesity


JEAN


Acute on chronic renal failure


UTI


Lactic acidosis


Atrial fibrillation with RVR


Thrombocytopenia





-Extubated yesterday with adequate gas exchange


-Continue with IVF fluids


-Vasopressor support for MAP<65, currently off neosynephrine


-Give bolus of amiodarone 150mg then initiate cardizem orally


-Supplemental oxygen to keep O2 sats 88-92%


- Avoid nephrotoxic agents


-Continue antibiotics, follow up cultures


-Resume Eliquis once it is ok per Surgery service


-OOB to chair daily, increase activity


-Initiate diet





Subjective


Date of service: 03/22/18


Principal diagnosis: Sepsis, Hydronephrosis


Interval history: 





F/UP for severe sepsis with septic shock: nephrolithiasis with obstructive 

uropathy: Morbid obesity with JEAN


Seen and examined.


Vitals, labs, medications, chart reviewed.


Off neosynephrine infusion


Currently has Afib with RVR


Patient has a history of chronic atrial fibrillation and is on eliquis for oral 

anticoagulation. 


 In 2105, he underwent a cardiac cath that reports no significant CAD, normal 

left ventricular ejection fraction. 


He states his night was decent, he tolerated his CPAP machine, feels very 

thirsty








Objective


 Vital Signs - 12hr











  03/21/18 03/21/18 03/21/18





  22:58 23:00 23:32


 


Temperature   99.3 F


 


Pulse Rate 107 H 106 H 


 


Pulse Rate [   





Bilateral]   


 


Pulse Rate [   





From Monitor]   


 


Respiratory 27 H 25 H 





Rate   


 


Respiratory   





Rate [Bilateral   





]   


 


Blood Pressure 130/69 125/73 


 


O2 Sat by Pulse 96 97 





Oximetry   














  03/22/18 03/22/18 03/22/18





  00:00 01:00 01:45


 


Temperature   


 


Pulse Rate 112 H 101 H 109 H


 


Pulse Rate [   





Bilateral]   


 


Pulse Rate [ 111 H  





From Monitor]   


 


Respiratory 26 H 28 H 19





Rate   


 


Respiratory   





Rate [Bilateral   





]   


 


Blood Pressure 123/63 135/64 124/66


 


O2 Sat by Pulse 100 97 97





Oximetry   














  03/22/18 03/22/18 03/22/18





  02:00 03:00 04:00


 


Temperature   98.6 F


 


Pulse Rate 103 H 109 H 104 H


 


Pulse Rate [   





Bilateral]   


 


Pulse Rate [   107 H





From Monitor]   


 


Respiratory 19 20 19





Rate   


 


Respiratory   





Rate [Bilateral   





]   


 


Blood Pressure 113/49 117/57 107/56


 


O2 Sat by Pulse 96 96 94





Oximetry   














  03/22/18 03/22/18 03/22/18





  05:00 05:49 06:00


 


Temperature  94 F L 


 


Pulse Rate 111 H  108 H


 


Pulse Rate [   





Bilateral]   


 


Pulse Rate [   





From Monitor]   


 


Respiratory 22  21





Rate   


 


Respiratory   





Rate [Bilateral   





]   


 


Blood Pressure 113/58  127/60


 


O2 Sat by Pulse 95  96





Oximetry   














  03/22/18 03/22/18 03/22/18





  07:00 07:30 08:00


 


Temperature   98.3 F


 


Pulse Rate 121 H  123 H


 


Pulse Rate [   





Bilateral]   


 


Pulse Rate [  125 H 





From Monitor]   


 


Respiratory 28 H 22 26 H





Rate   


 


Respiratory   





Rate [Bilateral   





]   


 


Blood Pressure 139/59  135/63


 


O2 Sat by Pulse 91  95





Oximetry   














  03/22/18 03/22/18 03/22/18





  08:51 09:00 09:13


 


Temperature   


 


Pulse Rate  128 H 


 


Pulse Rate [ 127 H  129 H





Bilateral]   


 


Pulse Rate [   





From Monitor]   


 


Respiratory  26 H 





Rate   


 


Respiratory 20  27 H





Rate [Bilateral   





]   


 


Blood Pressure  145/65 


 


O2 Sat by Pulse  95 





Oximetry   














  03/22/18 03/22/18





  09:14 09:15


 


Temperature  


 


Pulse Rate  130 H


 


Pulse Rate [  





Bilateral]  


 


Pulse Rate [  





From Monitor]  


 


Respiratory  22





Rate  


 


Respiratory  





Rate [Bilateral  





]  


 


Blood Pressure  128/67


 


O2 Sat by Pulse 97 95





Oximetry  











Constitutional: no acute distress, other (obese)


Eyes: non-icteric


ENT: oropharynx dry


Neck: supple, no lymphadenopathy, no JVD


Effort: normal


Ascultation: Bilateral: diminished breath sounds


Cardiovascular: irregular rhythm, other (S1,S2, no murmurs, gallops or rubs)


Gastrointestinal: normoactive bowel sounds, non-tender, non-distended, other (

No hepatoslenomegaly)


Integumentary: normal


Extremities: no cyanosis, no edema, pulses normal, other (Chronic venous stasis 

dermatitis)


Neurologic: normal mental status, non-focal exam, pupils equal and round, CN II-

XII normal, motor strength normal and


Psychiatric: mood appropriate, affect normal


CBC and BMP: 


 03/21/18 16:29





 03/21/18 20:49


ABG, PT/INR, D-dimer: 


PT/INR, D-dimer











PT  19.9 Sec. (12.2-14.9)  H  03/21/18  16:29    


 


INR  1.59  (0.87-1.13)  H  03/21/18  16:29    








Abnormal lab findings: 


 Abnormal Labs











  03/21/18 03/21/18 03/21/18





  11:27 11:27 11:27


 


WBC  12.1 H  


 


RBC  5.37 H  


 


Hgb  16.3 H  


 


Hct  48.7 H  


 


RDW  15.7 H  


 


Plt Count  136 L  


 


Lymph % (Auto)  6.5 L  


 


Mono % (Auto)  14.7 H  


 


Lymph #  0.8 L  


 


Mono #  1.8 H  


 


Seg Neutrophils %  78.3 H  


 


Lymphocytes % (Manual)   


 


Monocytes % (Manual)   


 


Seg Neutrophils #  9.5 H  


 


Seg Neutrophils # Man   


 


Lymphocytes # (Manual)   


 


Monocytes # (Manual)   


 


PT   20.5 H 


 


INR   1.65 H 


 


APTT   40.2 H 


 


VBG pH   


 


Sodium    133 L


 


Chloride    93.9 L


 


Carbon Dioxide    20 L


 


BUN    79 H


 


Creatinine    3.8 H


 


Glucose    153 H


 


Lactic Acid   


 


Calcium   


 


AST    75 H


 


ALT    58 H


 


Alkaline Phosphatase    142 H


 


C-Reactive Protein   


 


Albumin    2.8 L


 


Urine WBC (Auto)   














  03/21/18 03/21/18 03/21/18





  11:27 11:27 11:27


 


WBC   


 


RBC   


 


Hgb   


 


Hct   


 


RDW   


 


Plt Count   


 


Lymph % (Auto)   


 


Mono % (Auto)   


 


Lymph #   


 


Mono #   


 


Seg Neutrophils %   


 


Lymphocytes % (Manual)   


 


Monocytes % (Manual)   


 


Seg Neutrophils #   


 


Seg Neutrophils # Man   


 


Lymphocytes # (Manual)   


 


Monocytes # (Manual)   


 


PT   


 


INR   


 


APTT   


 


VBG pH   7.316 L 


 


Sodium   


 


Chloride   


 


Carbon Dioxide   


 


BUN   


 


Creatinine   


 


Glucose   


 


Lactic Acid  2.30 H*  


 


Calcium   


 


AST   


 


ALT   


 


Alkaline Phosphatase   


 


C-Reactive Protein    12.40 H


 


Albumin   


 


Urine WBC (Auto)   














  03/21/18 03/21/18 03/21/18





  13:49 16:29 16:29


 


WBC    11.7 H


 


RBC   


 


Hgb   


 


Hct   


 


RDW    15.9 H


 


Plt Count    121 L


 


Lymph % (Auto)   


 


Mono % (Auto)   


 


Lymph #   


 


Mono #   


 


Seg Neutrophils %   


 


Lymphocytes % (Manual)    5.0 L


 


Monocytes % (Manual)    19.0 H


 


Seg Neutrophils #   


 


Seg Neutrophils # Man    8.2 H


 


Lymphocytes # (Manual)    0.6 L


 


Monocytes # (Manual)    2.2 H


 


PT   


 


INR   


 


APTT   


 


VBG pH   


 


Sodium   


 


Chloride   


 


Carbon Dioxide   


 


BUN   


 


Creatinine   


 


Glucose   


 


Lactic Acid   2.20 H* 


 


Calcium   


 


AST   


 


ALT   


 


Alkaline Phosphatase   


 


C-Reactive Protein   


 


Albumin   


 


Urine WBC (Auto)  37.0 H  














  03/21/18 03/21/18 03/21/18





  16:29 17:22 20:49


 


WBC   


 


RBC   


 


Hgb   


 


Hct   


 


RDW   


 


Plt Count   


 


Lymph % (Auto)   


 


Mono % (Auto)   


 


Lymph #   


 


Mono #   


 


Seg Neutrophils %   


 


Lymphocytes % (Manual)   


 


Monocytes % (Manual)   


 


Seg Neutrophils #   


 


Seg Neutrophils # Man   


 


Lymphocytes # (Manual)   


 


Monocytes # (Manual)   


 


PT  19.9 H  


 


INR  1.59 H  


 


APTT  43.9 H  


 


VBG pH   


 


Sodium   


 


Chloride   


 


Carbon Dioxide   18 L 


 


BUN   70 H 


 


Creatinine   3.3 H 


 


Glucose   


 


Lactic Acid    2.10 H*


 


Calcium   8.1 L 


 


AST   


 


ALT   


 


Alkaline Phosphatase   


 


C-Reactive Protein   


 


Albumin   


 


Urine WBC (Auto)   














  03/21/18





  20:49


 


WBC 


 


RBC 


 


Hgb 


 


Hct 


 


RDW 


 


Plt Count 


 


Lymph % (Auto) 


 


Mono % (Auto) 


 


Lymph # 


 


Mono # 


 


Seg Neutrophils % 


 


Lymphocytes % (Manual) 


 


Monocytes % (Manual) 


 


Seg Neutrophils # 


 


Seg Neutrophils # Man 


 


Lymphocytes # (Manual) 


 


Monocytes # (Manual) 


 


PT 


 


INR 


 


APTT 


 


VBG pH 


 


Sodium  135 L


 


Chloride 


 


Carbon Dioxide  15 L


 


BUN  70 H


 


Creatinine  3.1 H


 


Glucose  108 H


 


Lactic Acid 


 


Calcium 


 


AST 


 


ALT 


 


Alkaline Phosphatase 


 


C-Reactive Protein 


 


Albumin 


 


Urine WBC (Auto) 











Critical care time in (mins) excluding proc time.: 35


Critical care attestation.: 


If time is entered above; I have spent that time in minutes in the direct care 

of this critically ill patient, excluding procedure time.

## 2018-03-23 LAB
ANISOCYTOSIS BLD QL SMEAR: (no result)
BAND NEUTROPHILS # (MANUAL): 1.2 K/MM3
BUN SERPL-MCNC: 54 MG/DL (ref 9–20)
BUN/CREAT SERPL: 28 %
CALCIUM SERPL-MCNC: 8.7 MG/DL (ref 8.4–10.2)
HCT VFR BLD CALC: 46.9 % (ref 35.5–45.6)
HEMOLYSIS INDEX: 19
HGB BLD-MCNC: 15.2 GM/DL (ref 11.8–15.2)
MCH RBC QN AUTO: 30 PG (ref 28–32)
MCHC RBC AUTO-ENTMCNC: 33 % (ref 32–34)
MCV RBC AUTO: 92 FL (ref 84–94)
MYELOCYTES # (MANUAL): 0 K/MM3
PLATELET # BLD: 135 K/MM3 (ref 140–440)
PROMYELOCYTES # (MANUAL): 0 K/MM3
RBC # BLD AUTO: 5.1 M/MM3 (ref 3.65–5.03)
TOTAL CELLS COUNTED BLD: 100

## 2018-03-23 RX ADMIN — Medication SCH ML: at 01:32

## 2018-03-23 RX ADMIN — ALLOPURINOL SCH MG: 100 TABLET ORAL at 10:09

## 2018-03-23 RX ADMIN — FUROSEMIDE SCH MG: 40 TABLET ORAL at 06:25

## 2018-03-23 RX ADMIN — OMEGA-3 FATTY ACIDS CAP 1000 MG SCH MG: 1000 CAP at 10:09

## 2018-03-23 RX ADMIN — Medication SCH MCG: at 10:09

## 2018-03-23 RX ADMIN — DILTIAZEM HYDROCHLORIDE SCH MG: 60 TABLET, FILM COATED ORAL at 00:00

## 2018-03-23 RX ADMIN — Medication SCH EACH: at 11:36

## 2018-03-23 RX ADMIN — HEPARIN SODIUM SCH UNIT: 5000 INJECTION, SOLUTION INTRAVENOUS; SUBCUTANEOUS at 16:01

## 2018-03-23 RX ADMIN — MULTIPLE VITAMINS W/ MINERALS TAB SCH EACH: TAB at 10:09

## 2018-03-23 RX ADMIN — HEPARIN SODIUM SCH UNIT: 5000 INJECTION, SOLUTION INTRAVENOUS; SUBCUTANEOUS at 21:59

## 2018-03-23 RX ADMIN — Medication SCH ML: at 22:00

## 2018-03-23 RX ADMIN — Medication SCH ML: at 10:10

## 2018-03-23 RX ADMIN — OXYCODONE HYDROCHLORIDE AND ACETAMINOPHEN SCH MG: 500 TABLET ORAL at 21:58

## 2018-03-23 RX ADMIN — PRAVASTATIN SODIUM SCH MG: 80 TABLET ORAL at 21:58

## 2018-03-23 RX ADMIN — LISINOPRIL SCH MG: 40 TABLET ORAL at 10:09

## 2018-03-23 RX ADMIN — SODIUM CHLORIDE SCH MLS/HR: 0.9 INJECTION, SOLUTION INTRAVENOUS at 06:26

## 2018-03-23 RX ADMIN — FUROSEMIDE SCH MG: 40 TABLET ORAL at 17:24

## 2018-03-23 RX ADMIN — DILTIAZEM HYDROCHLORIDE SCH MG: 60 TABLET, FILM COATED ORAL at 06:25

## 2018-03-23 RX ADMIN — POTASSIUM CHLORIDE SCH MEQ: 1500 TABLET, EXTENDED RELEASE ORAL at 10:09

## 2018-03-23 RX ADMIN — DILTIAZEM HYDROCHLORIDE SCH MG: 180 CAPSULE, COATED, EXTENDED RELEASE ORAL at 16:02

## 2018-03-23 RX ADMIN — EZETIMIBE SCH MG: 10 TABLET ORAL at 21:58

## 2018-03-23 RX ADMIN — INSULIN GLARGINE SCH UNITS: 100 INJECTION, SOLUTION SUBCUTANEOUS at 16:02

## 2018-03-23 RX ADMIN — OXYCODONE HYDROCHLORIDE AND ACETAMINOPHEN SCH MG: 500 TABLET ORAL at 10:09

## 2018-03-23 RX ADMIN — DILTIAZEM HYDROCHLORIDE SCH MG: 60 TABLET, FILM COATED ORAL at 11:37

## 2018-03-23 NOTE — PROGRESS NOTE
Assessment and Plan


Assessment and plan: 





Sepsis.  Continue IV antibiotics and IV fluid hydration.  Follow-up serial 

lactic acid levels.  Follow-up UA, urine and blood cultures.





Paroxysmal Atrial fibrillation with RVR.  Cardiology following.  Continue 

Cardizem.  Rate controlled.  Check echocardiogram.  Continue eliquis.





Acute on chronic renal failure.  Improved.  Etiology is multifactorial 

secondary to acute kidney injury from sepsis/ATN/vasomotor nephropathy/volume 

depletion and obstructive uropathy from ureteral stone/stricture.  Continue IV 

fluid hydration and follow BMP closely.  Nephrology following.





Obstructive uropathy/hydronephrosis.  Etiology secondary to Right ureteral stone

/stricture.  Patient is status post cystoscopic stent placement.





Diabetes mellitus type 2.  Continue Accu-Cheks and sliding scale insulin.





Hypertension.  Resume antihypertensive medications.





Disposition.  Patient will be transferred to the floor.














History


Interval history: 





No new issues overnight.





Hospitalist Physical





- Constitutional


Vitals: 


 











Temp Pulse Resp BP Pulse Ox


 


 97.3 F L  109 H  17   122/69   97 


 


 03/23/18 08:00  03/23/18 10:15  03/23/18 10:15  03/23/18 10:15  03/23/18 10:15











General appearance: Present: no acute distress





- EENT


Eyes: Present: PERRL, EOM intact


ENT: hearing intact, clear oral mucosa, dentition normal





- Neck


Neck: Present: supple, normal ROM





- Respiratory


Respiratory effort: normal


Respiratory: bilateral: CTA





- Cardiovascular


Rhythm: regular


Heart Sounds: Present: S1 & S2.  Absent: gallop, rub





- Extremities


Extremities: no ischemia, No edema, Full ROM





- Abdominal


General gastrointestinal: soft, non-tender, non-distended, normal bowel sounds





- Integumentary


Integumentary: Present: clear, warm, dry





- Neurologic


Neurologic: CNII-XII intact, moves all extremities





Results





- Labs


CBC & Chem 7: 


 03/23/18 03:58





 03/23/18 03:58


Labs: 


 Laboratory Last Values











WBC  9.5 K/mm3 (4.5-11.0)   03/23/18  03:58    


 


RBC  5.10 M/mm3 (3.65-5.03)  H  03/23/18  03:58    


 


Hgb  15.2 gm/dl (11.8-15.2)   03/23/18  03:58    


 


Hct  46.9 % (35.5-45.6)  H  03/23/18  03:58    


 


MCV  92 fl (84-94)   03/23/18  03:58    


 


MCH  30 pg (28-32)   03/23/18  03:58    


 


MCHC  33 % (32-34)   03/23/18  03:58    


 


RDW  15.9 % (13.2-15.2)  H  03/23/18  03:58    


 


Plt Count  135 K/mm3 (140-440)  L  03/23/18  03:58    


 


Lymph % (Auto)  6.5 % (13.4-35.0)  L  03/21/18  11:27    


 


Mono % (Auto)  Np   03/23/18  03:58    


 


Eos % (Auto)  0.2 % (0.0-4.3)   03/21/18  11:27    


 


Baso % (Auto)  0.3 % (0.0-1.8)   03/21/18  11:27    


 


Lymph #  0.8 K/mm3 (1.2-5.4)  L  03/21/18  11:27    


 


Mono #  1.8 K/mm3 (0.0-0.8)  H  03/21/18  11:27    


 


Eos #  0.0 K/mm3 (0.0-0.4)   03/21/18  11:27    


 


Baso #  0.0 K/mm3 (0.0-0.1)   03/21/18  11:27    


 


Add Manual Diff  Complete   03/23/18  03:58    


 


Total Counted  100   03/23/18  03:58    


 


Seg Neutrophils %  78.3 % (40.0-70.0)  H  03/21/18  11:27    


 


Seg Neuts % (Manual)  60.0 % (40.0-70.0)   03/23/18  03:58    


 


Band Neutrophils %  13.0 %  03/23/18  03:58    


 


Lymphocytes % (Manual)  9.0 % (13.4-35.0)  L  03/23/18  03:58    


 


Reactive Lymphs % (Man)  0 %  03/23/18  03:58    


 


Monocytes % (Manual)  14.0 % (0.0-7.3)  H  03/23/18  03:58    


 


Eosinophils % (Manual)  4.0 % (0.0-4.3)   03/23/18  03:58    


 


Basophils % (Manual)  0 % (0.0-1.8)   03/23/18  03:58    


 


Metamyelocytes %  0 %  03/23/18  03:58    


 


Myelocytes %  0 %  03/23/18  03:58    


 


Promyelocytes %  0 %  03/23/18  03:58    


 


Blast Cells %  0 %  03/23/18  03:58    


 


Nucleated RBC %  Not Reportable   03/23/18  03:58    


 


Seg Neutrophils #  9.5 K/mm3 (1.8-7.7)  H  03/21/18  11:27    


 


Seg Neutrophils # Man  5.7 K/mm3 (1.8-7.7)   03/23/18  03:58    


 


Band Neutrophils #  1.2 K/mm3  03/23/18  03:58    


 


Lymphocytes # (Manual)  0.9 K/mm3 (1.2-5.4)  L  03/23/18  03:58    


 


Abs React Lymphs (Man)  0.0 K/mm3  03/23/18  03:58    


 


Monocytes # (Manual)  1.3 K/mm3 (0.0-0.8)  H  03/23/18  03:58    


 


Eosinophils # (Manual)  0.4 K/mm3 (0.0-0.4)   03/23/18  03:58    


 


Basophils # (Manual)  0.0 K/mm3 (0.0-0.1)   03/23/18  03:58    


 


Metamyelocytes #  0.0 K/mm3  03/23/18  03:58    


 


Myelocytes #  0.0 K/mm3  03/23/18  03:58    


 


Promyelocytes #  0.0 K/mm3  03/23/18  03:58    


 


Blast Cells #  0.0 K/mm3  03/23/18  03:58    


 


WBC Morphology  Not Reportable   03/23/18  03:58    


 


Hypersegmented Neuts  Not Reportable   03/23/18  03:58    


 


Hyposegmented Neuts  Not Reportable   03/23/18  03:58    


 


Hypogranular Neuts  Not Reportable   03/23/18  03:58    


 


Smudge Cells  Not Reportable   03/23/18  03:58    


 


Toxic Granulation  Not Reportable   03/23/18  03:58    


 


Toxic Vacuolation  Not Reportable   03/23/18  03:58    


 


Dohle Bodies  Not Reportable   03/23/18  03:58    


 


Pelger-Huet Anomaly  Not Reportable   03/23/18  03:58    


 


Jax Rods  Not Reportable   03/23/18  03:58    


 


Platelet Estimate  Appears normal   03/23/18  03:58    


 


Clumped Platelets  Not Reportable   03/23/18  03:58    


 


Plt Clumps, EDTA  Not Reportable   03/23/18  03:58    


 


Large Platelets  Not Reportable   03/23/18  03:58    


 


Giant Platelets  Not Reportable   03/23/18  03:58    


 


Platelet Satelliting  Not Reportable   03/23/18  03:58    


 


Plt Morphology Comment  Not Reportable   03/23/18  03:58    


 


RBC Morphology  Not Reportable   03/23/18  03:58    


 


Dimorphic RBCs  Not Reportable   03/23/18  03:58    


 


Polychromasia  Not Reportable   03/23/18  03:58    


 


Hypochromasia  Not Reportable   03/23/18  03:58    


 


Poikilocytosis  Not Reportable   03/23/18  03:58    


 


Anisocytosis  1+   03/23/18  03:58    


 


Microcytosis  Not Reportable   03/23/18  03:58    


 


Macrocytosis  Few   03/23/18  03:58    


 


Spherocytes  Not Reportable   03/23/18  03:58    


 


Pappenheimer Bodies  Not Reportable   03/23/18  03:58    


 


Sickle Cells  Not Reportable   03/23/18  03:58    


 


Target Cells  Not Reportable   03/23/18  03:58    


 


Tear Drop Cells  Not Reportable   03/23/18  03:58    


 


Ovalocytes  Not Reportable   03/23/18  03:58    


 


Helmet Cells  Not Reportable   03/23/18  03:58    


 


Torres-Muldraugh Bodies  Not Reportable   03/23/18  03:58    


 


Cabot Rings  Not Reportable   03/23/18  03:58    


 


Warren Cells  Not Reportable   03/23/18  03:58    


 


Bite Cells  Not Reportable   03/23/18  03:58    


 


Crenated Cell  Not Reportable   03/23/18  03:58    


 


Elliptocytes  Not Reportable   03/23/18  03:58    


 


Acanthocytes (Spur)  Not Reportable   03/23/18  03:58    


 


Rouleaux  Not Reportable   03/23/18  03:58    


 


Hemoglobin C Crystals  Not Reportable   03/23/18  03:58    


 


Schistocytes  Not Reportable   03/23/18  03:58    


 


Malaria parasites  Not Reportable   03/23/18  03:58    


 


Beau Bodies  Not Reportable   03/23/18  03:58    


 


Hem Pathologist Commnt  No   03/23/18  03:58    


 


PT  19.9 Sec. (12.2-14.9)  H  03/21/18  16:29    


 


INR  1.59  (0.87-1.13)  H  03/21/18  16:29    


 


APTT  43.9 Sec. (24.2-36.6)  H  03/21/18  16:29    


 


VBG pH  7.316  (7.320-7.420)  L  03/21/18  11:27    


 


Sodium  141 mmol/L (137-145)   03/23/18  03:58    


 


Potassium  3.9 mmol/L (3.6-5.0)   03/23/18  03:58    


 


Chloride  106.3 mmol/L ()   03/23/18  03:58    


 


Carbon Dioxide  20 mmol/L (22-30)  L  03/23/18  03:58    


 


Anion Gap  19 mmol/L  03/23/18  03:58    


 


BUN  54 mg/dL (9-20)  H  03/23/18  03:58    


 


Creatinine  1.9 mg/dL (0.8-1.5)  H  03/23/18  03:58    


 


Estimated GFR  35 ml/min  03/23/18  03:58    


 


BUN/Creatinine Ratio  28 %  03/23/18  03:58    


 


Glucose  243 mg/dL ()  H  03/23/18  03:58    


 


POC Glucose  243  ()  H  03/23/18  05:02    


 


Lactic Acid  2.10 mmol/L (0.7-2.0)  H*  03/21/18  20:49    


 


Calcium  8.7 mg/dL (8.4-10.2)   03/23/18  03:58    


 


Total Bilirubin  0.90 mg/dL (0.1-1.2)   03/21/18  11:27    


 


AST  75 units/L (5-40)  H  03/21/18  11:27    


 


ALT  58 units/L (7-56)  H  03/21/18  11:27    


 


Alkaline Phosphatase  142 units/L ()  H  03/21/18  11:27    


 


C-Reactive Protein  12.40 mg/dL (0.00-1.30)  H  03/21/18  11:27    


 


Total Protein  6.5 g/dL (6.3-8.2)   03/21/18  11:27    


 


Albumin  2.8 g/dL (3.9-5)  L  03/21/18  11:27    


 


Albumin/Globulin Ratio  0.8 %  03/21/18  11:27    


 


Urine Color  Zamzam  (Yellow)   03/21/18  13:49    


 


Urine Turbidity  Clear  (Clear)   03/21/18  13:49    


 


Urine pH  5.0  (5.0-7.0)   03/21/18  13:49    


 


Ur Specific Gravity  1.010  (1.003-1.030)   03/21/18  13:49    


 


Urine Protein  30 mg/dl mg/dL (Negative)   03/21/18  13:49    


 


Urine Glucose (UA)  >=500 mg/dL (Negative)   03/21/18  13:49    


 


Urine Ketones  Neg mg/dL (Negative)   03/21/18  13:49    


 


Urine Blood  Lg  (Negative)   03/21/18  13:49    


 


Urine Nitrite  Neg  (Negative)   03/21/18  13:49    


 


Urine Bilirubin  Neg  (Negative)   03/21/18  13:49    


 


Urine Urobilinogen  < 2.0 mg/dL (<2.0)   03/21/18  13:49    


 


Ur Leukocyte Esterase  Tr  (Negative)   03/21/18  13:49    


 


Urine WBC (Auto)  37.0 /HPF (0.0-6.0)  H  03/21/18  13:49    


 


Urine RBC (Auto)  > 182.0 /HPF (0.0-6.0)   03/21/18  13:49    


 


U Epithel Cells (Auto)  1.0 /HPF (0-13.0)   03/21/18  13:49    


 


Urine Bacteria (Auto)  1+ /HPF (Negative)   03/21/18  13:49    


 


Urine Mucus  Few /HPF  03/21/18  13:49

## 2018-03-23 NOTE — PROGRESS NOTE
Assessment and Plan








ASSESSMENT AND PLAN:





Severe sepsis with shock.


Nephrolithiasis with obstructive uropathy.


Morbid obesity.


Obstructive sleep apnea.


Leukocytosis


Mild hyponatremia.  


Mild metabolic acidosis.  


lactic acidosis.  


Elevated serum transaminases





(Doing much better)





- continue and complete AB's per ID recs


- continue BIPAP qhs / while asleep


- s/p Cysto DVIU with insertion of right ureteral stent


- continue GI & VTE prophylaxis


- PT/OT as tolerated


- gentle hydration 


- Azotemia improving


- definitive stone removal once sepsis resolves


- OK to transfer to medical floor





....35'





Subjective


Date of service: 03/23/18


Principal diagnosis: Severe Sepsis with Shock; Hydronephrosis with Obstructive 

Uropathy; NATALIE


Interval history: 





Patient seen today for:





Seen and examined at bedside; 24-hour events reviewed; nursing and respiratory 

care staff consulted; no adverse overnight events reported to me; looks and 

feels better; tolerating BIPAP much better after humidification added; denies 

acute chest or abdominal pain; No N/V/F/C





Objective


 Vital Signs - 12hr











  03/23/18 03/23/18 03/23/18





  01:00 02:00 03:00


 


Temperature   


 


Pulse Rate 96 H 87 89


 


Pulse Rate [   





From Monitor]   


 


Respiratory 23 21 19





Rate   


 


Blood Pressure 103/73 102/78 107/64


 


O2 Sat by Pulse 96 97 92





Oximetry   














  03/23/18 03/23/18 03/23/18





  03:12 03:13 04:00


 


Temperature  98.8 F 


 


Pulse Rate 97 H  99 H


 


Pulse Rate [   90





From Monitor]   


 


Respiratory 16  16





Rate   


 


Blood Pressure 107/64  116/70


 


O2 Sat by Pulse 96  95





Oximetry   














  03/23/18 03/23/18 03/23/18





  05:00 06:00 06:25


 


Temperature   


 


Pulse Rate 89 90 99 H


 


Pulse Rate [   





From Monitor]   


 


Respiratory 21 21 





Rate   


 


Blood Pressure 118/63 113/68 113/68


 


O2 Sat by Pulse 93 91 





Oximetry   














  03/23/18 03/23/18 03/23/18





  07:01 08:00 08:01


 


Temperature  97.3 F L 


 


Pulse Rate 95 H  117 H


 


Pulse Rate [  105 H 





From Monitor]   


 


Respiratory 17 20 22





Rate   


 


Blood Pressure 135/69  135/69


 


O2 Sat by Pulse 92 98 





Oximetry   














  03/23/18 03/23/18 03/23/18





  09:01 09:15 09:20


 


Temperature   


 


Pulse Rate 106 H 114 H 


 


Pulse Rate [   





From Monitor]   


 


Respiratory 25 H 27 H 





Rate   


 


Blood Pressure 112/70 112/70 


 


O2 Sat by Pulse 94 98 97





Oximetry   














  03/23/18 03/23/18 03/23/18





  09:31 09:45 10:00


 


Temperature   


 


Pulse Rate 103 H 115 H 98 H


 


Pulse Rate [   





From Monitor]   


 


Respiratory 24 32 H 





Rate   


 


Blood Pressure 112/70 112/70 


 


O2 Sat by Pulse 96 96 





Oximetry   














  03/23/18 03/23/18 03/23/18





  10:01 10:09 10:15


 


Temperature   


 


Pulse Rate 108 H 106 H 109 H


 


Pulse Rate [   





From Monitor]   


 


Respiratory 23  17





Rate   


 


Blood Pressure 112/70 122/69 122/69


 


O2 Sat by Pulse 97  97





Oximetry   














  03/23/18 03/23/18 03/23/18





  10:31 10:45 11:01


 


Temperature   


 


Pulse Rate 103 H 104 H 105 H


 


Pulse Rate [   





From Monitor]   


 


Respiratory 19 24 28 H





Rate   


 


Blood Pressure 122/69 122/69 106/75


 


O2 Sat by Pulse 97 92 99





Oximetry   














  03/23/18 03/23/18





  11:37 11:54


 


Temperature  


 


Pulse Rate 102 H 


 


Pulse Rate [  102 H





From Monitor]  


 


Respiratory  22





Rate  


 


Blood Pressure 106/75 


 


O2 Sat by Pulse  98





Oximetry  











Constitutional: no acute distress, other (obese)


Eyes: non-icteric


ENT: oropharynx moist, other (large neck circumference)


Neck: supple, no lymphadenopathy, no JVD, other (no thyromegally; mallampatti 4)


Effort: normal


Ascultation: Bilateral: clear, rhonchi (inspiratory in bases L>R)


Percussion: Bilateral: not dull


Cardiovascular: irregular rhythm, other (S1,S2, no murmurs, gallops or rubs)


Gastrointestinal: normoactive bowel sounds, soft, non-tender, non-distended, 

other (No hepatoslenomegaly)


Integumentary: rash (stasis dermatitis to legs)


Extremities: no cyanosis, no edema, pulses normal, other (Chronic venous stasis 

dermatitis)


Neurologic: normal mental status, non-focal exam, pupils equal and round, CN II-

XII normal, motor strength normal and


Psychiatric: mood appropriate, affect normal


CBC and BMP: 


 03/24/18 04:54





 03/24/18 04:54


ABG, PT/INR, D-dimer: 


PT/INR, D-dimer











PT  19.9 Sec. (12.2-14.9)  H  03/21/18  16:29    


 


INR  1.59  (0.87-1.13)  H  03/21/18  16:29    








Abnormal lab findings: 


 Abnormal Labs











  03/21/18 03/21/18 03/21/18





  11:27 11:27 11:27


 


WBC  12.1 H  


 


RBC  5.37 H  


 


Hgb  16.3 H  


 


Hct  48.7 H  


 


RDW  15.7 H  


 


Plt Count  136 L  


 


Lymph % (Auto)  6.5 L  


 


Mono % (Auto)  14.7 H  


 


Lymph #  0.8 L  


 


Mono #  1.8 H  


 


Seg Neutrophils %  78.3 H  


 


Lymphocytes % (Manual)   


 


Monocytes % (Manual)   


 


Seg Neutrophils #  9.5 H  


 


Seg Neutrophils # Man   


 


Lymphocytes # (Manual)   


 


Monocytes # (Manual)   


 


PT   20.5 H 


 


INR   1.65 H 


 


APTT   40.2 H 


 


VBG pH   


 


Sodium    133 L


 


Chloride    93.9 L


 


Carbon Dioxide    20 L


 


BUN    79 H


 


Creatinine    3.8 H


 


Glucose    153 H


 


POC Glucose   


 


Lactic Acid   


 


Calcium   


 


AST    75 H


 


ALT    58 H


 


Alkaline Phosphatase    142 H


 


C-Reactive Protein   


 


Albumin    2.8 L


 


Urine WBC (Auto)   














  03/21/18 03/21/18 03/21/18





  11:27 11:27 11:27


 


WBC   


 


RBC   


 


Hgb   


 


Hct   


 


RDW   


 


Plt Count   


 


Lymph % (Auto)   


 


Mono % (Auto)   


 


Lymph #   


 


Mono #   


 


Seg Neutrophils %   


 


Lymphocytes % (Manual)   


 


Monocytes % (Manual)   


 


Seg Neutrophils #   


 


Seg Neutrophils # Man   


 


Lymphocytes # (Manual)   


 


Monocytes # (Manual)   


 


PT   


 


INR   


 


APTT   


 


VBG pH   7.316 L 


 


Sodium   


 


Chloride   


 


Carbon Dioxide   


 


BUN   


 


Creatinine   


 


Glucose   


 


POC Glucose   


 


Lactic Acid  2.30 H*  


 


Calcium   


 


AST   


 


ALT   


 


Alkaline Phosphatase   


 


C-Reactive Protein    12.40 H


 


Albumin   


 


Urine WBC (Auto)   














  03/21/18 03/21/18 03/21/18





  13:49 16:29 16:29


 


WBC    11.7 H


 


RBC   


 


Hgb   


 


Hct   


 


RDW    15.9 H


 


Plt Count    121 L


 


Lymph % (Auto)   


 


Mono % (Auto)   


 


Lymph #   


 


Mono #   


 


Seg Neutrophils %   


 


Lymphocytes % (Manual)    5.0 L


 


Monocytes % (Manual)    19.0 H


 


Seg Neutrophils #   


 


Seg Neutrophils # Man    8.2 H


 


Lymphocytes # (Manual)    0.6 L


 


Monocytes # (Manual)    2.2 H


 


PT   


 


INR   


 


APTT   


 


VBG pH   


 


Sodium   


 


Chloride   


 


Carbon Dioxide   


 


BUN   


 


Creatinine   


 


Glucose   


 


POC Glucose   


 


Lactic Acid   2.20 H* 


 


Calcium   


 


AST   


 


ALT   


 


Alkaline Phosphatase   


 


C-Reactive Protein   


 


Albumin   


 


Urine WBC (Auto)  37.0 H  














  03/21/18 03/21/18 03/21/18





  16:29 17:22 20:49


 


WBC   


 


RBC   


 


Hgb   


 


Hct   


 


RDW   


 


Plt Count   


 


Lymph % (Auto)   


 


Mono % (Auto)   


 


Lymph #   


 


Mono #   


 


Seg Neutrophils %   


 


Lymphocytes % (Manual)   


 


Monocytes % (Manual)   


 


Seg Neutrophils #   


 


Seg Neutrophils # Man   


 


Lymphocytes # (Manual)   


 


Monocytes # (Manual)   


 


PT  19.9 H  


 


INR  1.59 H  


 


APTT  43.9 H  


 


VBG pH   


 


Sodium   


 


Chloride   


 


Carbon Dioxide   18 L 


 


BUN   70 H 


 


Creatinine   3.3 H 


 


Glucose   


 


POC Glucose   


 


Lactic Acid    2.10 H*


 


Calcium   8.1 L 


 


AST   


 


ALT   


 


Alkaline Phosphatase   


 


C-Reactive Protein   


 


Albumin   


 


Urine WBC (Auto)   














  03/21/18 03/22/18 03/22/18





  20:49 11:40 17:25


 


WBC   


 


RBC   


 


Hgb   


 


Hct   


 


RDW   


 


Plt Count   


 


Lymph % (Auto)   


 


Mono % (Auto)   


 


Lymph #   


 


Mono #   


 


Seg Neutrophils %   


 


Lymphocytes % (Manual)   


 


Monocytes % (Manual)   


 


Seg Neutrophils #   


 


Seg Neutrophils # Man   


 


Lymphocytes # (Manual)   


 


Monocytes # (Manual)   


 


PT   


 


INR   


 


APTT   


 


VBG pH   


 


Sodium  135 L  


 


Chloride   


 


Carbon Dioxide  15 L  


 


BUN  70 H  


 


Creatinine  3.1 H  


 


Glucose  108 H  


 


POC Glucose   169 H  313 H


 


Lactic Acid   


 


Calcium   


 


AST   


 


ALT   


 


Alkaline Phosphatase   


 


C-Reactive Protein   


 


Albumin   


 


Urine WBC (Auto)   














  03/22/18 03/23/18 03/23/18





  17:41 00:11 03:58


 


WBC   


 


RBC    5.10 H


 


Hgb   


 


Hct    46.9 H


 


RDW    15.9 H


 


Plt Count    135 L


 


Lymph % (Auto)   


 


Mono % (Auto)   


 


Lymph #   


 


Mono #   


 


Seg Neutrophils %   


 


Lymphocytes % (Manual)    9.0 L


 


Monocytes % (Manual)    14.0 H


 


Seg Neutrophils #   


 


Seg Neutrophils # Man   


 


Lymphocytes # (Manual)    0.9 L


 


Monocytes # (Manual)    1.3 H


 


PT   


 


INR   


 


APTT   


 


VBG pH   


 


Sodium   


 


Chloride   


 


Carbon Dioxide  20 L  


 


BUN  61 H  


 


Creatinine  2.5 H  


 


Glucose  315 H  


 


POC Glucose   277 H 


 


Lactic Acid   


 


Calcium   


 


AST   


 


ALT   


 


Alkaline Phosphatase   


 


C-Reactive Protein   


 


Albumin   


 


Urine WBC (Auto)   














  03/23/18 03/23/18





  03:58 05:02


 


WBC  


 


RBC  


 


Hgb  


 


Hct  


 


RDW  


 


Plt Count  


 


Lymph % (Auto)  


 


Mono % (Auto)  


 


Lymph #  


 


Mono #  


 


Seg Neutrophils %  


 


Lymphocytes % (Manual)  


 


Monocytes % (Manual)  


 


Seg Neutrophils #  


 


Seg Neutrophils # Man  


 


Lymphocytes # (Manual)  


 


Monocytes # (Manual)  


 


PT  


 


INR  


 


APTT  


 


VBG pH  


 


Sodium  


 


Chloride  


 


Carbon Dioxide  20 L 


 


BUN  54 H 


 


Creatinine  1.9 H 


 


Glucose  243 H 


 


POC Glucose   243 H


 


Lactic Acid  


 


Calcium  


 


AST  


 


ALT  


 


Alkaline Phosphatase  


 


C-Reactive Protein  


 


Albumin  


 


Urine WBC (Auto)  











Chest x-ray: image reviewed (cardiomegaly; clear otherwise; lordotic film)


Allied health notes reviewed: nursing

## 2018-03-23 NOTE — PROGRESS NOTE
Assessment and Plan





wbc down as is creatinine


will need tx of stone after sepsis resolved 





Subjective


Date of service: 03/23/18


Principal diagnosis: Sepsis, Hydronephrosis





Objective





- Constitutional


Vitals: 


 Vital Signs - 12hr











  03/22/18 03/23/18 03/23/18





  23:51 00:00 01:00


 


Temperature  98.4 F 


 


Pulse Rate 91 H 94 H 96 H


 


Pulse Rate [  103 H 





From Monitor]   


 


Respiratory 21 22 23





Rate   


 


Blood Pressure 115/72 115/72 103/73


 


O2 Sat by Pulse 96 96 96





Oximetry   














  03/23/18 03/23/18 03/23/18





  02:00 03:00 03:12


 


Temperature   


 


Pulse Rate 87 89 97 H


 


Pulse Rate [   





From Monitor]   


 


Respiratory 21 19 16





Rate   


 


Blood Pressure 102/78 107/64 107/64


 


O2 Sat by Pulse 97 92 96





Oximetry   














  03/23/18 03/23/18 03/23/18





  03:13 04:00 05:00


 


Temperature 98.8 F  


 


Pulse Rate  99 H 89


 


Pulse Rate [  90 





From Monitor]   


 


Respiratory  16 21





Rate   


 


Blood Pressure  116/70 118/63


 


O2 Sat by Pulse  95 93





Oximetry   














  03/23/18 03/23/18 03/23/18





  06:00 06:25 07:01


 


Temperature   


 


Pulse Rate 90 99 H 95 H


 


Pulse Rate [   





From Monitor]   


 


Respiratory 21  17





Rate   


 


Blood Pressure 113/68 113/68 135/69


 


O2 Sat by Pulse 91  92





Oximetry   














  03/23/18 03/23/18 03/23/18





  08:00 08:01 09:01


 


Temperature 97.3 F L  


 


Pulse Rate  117 H 106 H


 


Pulse Rate [ 105 H  





From Monitor]   


 


Respiratory 20 22 25 H





Rate   


 


Blood Pressure  135/69 112/70


 


O2 Sat by Pulse 98  94





Oximetry   














  03/23/18 03/23/18 03/23/18





  09:15 09:20 09:31


 


Temperature   


 


Pulse Rate 114 H  103 H


 


Pulse Rate [   





From Monitor]   


 


Respiratory 27 H  24





Rate   


 


Blood Pressure 112/70  112/70


 


O2 Sat by Pulse 98 97 96





Oximetry   














  03/23/18 03/23/18 03/23/18





  09:45 10:00 10:01


 


Temperature   


 


Pulse Rate 115 H 98 H 108 H


 


Pulse Rate [   





From Monitor]   


 


Respiratory 32 H  23





Rate   


 


Blood Pressure 112/70  112/70


 


O2 Sat by Pulse 96  97





Oximetry   














  03/23/18 03/23/18 03/23/18





  10:09 10:15 10:31


 


Temperature   


 


Pulse Rate 106 H 109 H 103 H


 


Pulse Rate [   





From Monitor]   


 


Respiratory  17 19





Rate   


 


Blood Pressure 122/69 122/69 122/69


 


O2 Sat by Pulse  97 97





Oximetry   














  03/23/18 03/23/18





  10:45 11:01


 


Temperature  


 


Pulse Rate 104 H 105 H


 


Pulse Rate [  





From Monitor]  


 


Respiratory 24 28 H





Rate  


 


Blood Pressure 122/69 106/75


 


O2 Sat by Pulse 92 99





Oximetry  














- Labs


CBC & Chem 7: 


 03/23/18 03:58





 03/23/18 03:58


Labs: 


 Abnormal lab results











  03/22/18 03/22/18 03/22/18 Range/Units





  11:40 17:25 17:41 


 


RBC     (3.65-5.03)  M/mm3


 


Hct     (35.5-45.6)  %


 


RDW     (13.2-15.2)  %


 


Plt Count     (140-440)  K/mm3


 


Lymphocytes % (Manual)     (13.4-35.0)  %


 


Monocytes % (Manual)     (0.0-7.3)  %


 


Lymphocytes # (Manual)     (1.2-5.4)  K/mm3


 


Monocytes # (Manual)     (0.0-0.8)  K/mm3


 


Carbon Dioxide    20 L  (22-30)  mmol/L


 


BUN    61 H  (9-20)  mg/dL


 


Creatinine    2.5 H  (0.8-1.5)  mg/dL


 


Glucose    315 H  ()  mg/dL


 


POC Glucose  169 H  313 H   ()  














  03/23/18 03/23/18 03/23/18 Range/Units





  00:11 03:58 03:58 


 


RBC   5.10 H   (3.65-5.03)  M/mm3


 


Hct   46.9 H   (35.5-45.6)  %


 


RDW   15.9 H   (13.2-15.2)  %


 


Plt Count   135 L   (140-440)  K/mm3


 


Lymphocytes % (Manual)   9.0 L   (13.4-35.0)  %


 


Monocytes % (Manual)   14.0 H   (0.0-7.3)  %


 


Lymphocytes # (Manual)   0.9 L   (1.2-5.4)  K/mm3


 


Monocytes # (Manual)   1.3 H   (0.0-0.8)  K/mm3


 


Carbon Dioxide    20 L  (22-30)  mmol/L


 


BUN    54 H  (9-20)  mg/dL


 


Creatinine    1.9 H  (0.8-1.5)  mg/dL


 


Glucose    243 H  ()  mg/dL


 


POC Glucose  277 H    ()  














  03/23/18 Range/Units





  05:02 


 


RBC   (3.65-5.03)  M/mm3


 


Hct   (35.5-45.6)  %


 


RDW   (13.2-15.2)  %


 


Plt Count   (140-440)  K/mm3


 


Lymphocytes % (Manual)   (13.4-35.0)  %


 


Monocytes % (Manual)   (0.0-7.3)  %


 


Lymphocytes # (Manual)   (1.2-5.4)  K/mm3


 


Monocytes # (Manual)   (0.0-0.8)  K/mm3


 


Carbon Dioxide   (22-30)  mmol/L


 


BUN   (9-20)  mg/dL


 


Creatinine   (0.8-1.5)  mg/dL


 


Glucose   ()  mg/dL


 


POC Glucose  243 H  ()

## 2018-03-23 NOTE — PROGRESS NOTE
Assessment and Plan





Sepsis


Acute kidney injury 


Right ureteral stone with obstruction


   s/p cystoscopy, urethrotomy and stent.


Right hydronephrosis, moderate 


Atrial fibrillation, chronic


   on eliquis for oral anticoagulation as an outpatient


Diabetes mellitus


Thrombocytopenia











Subjective


Date of service: 03/23/18


Principal diagnosis: Sepsis, Hydronephrosis


Interval history: 





Patient is sitting up in bedside chair. There are no cardiac complaints.


Afib with mild RVR on telemetry monitoring.





Objective


 Vital Signs











  Temp Pulse Pulse Resp BP Pulse Ox


 


 03/23/18 09:20       97


 


 03/23/18 09:01   106 H   25 H  112/70  94


 


 03/23/18 08:01   117 H   22  135/69 


 


 03/23/18 08:00  97.3 F L   105 H  20   98


 


 03/23/18 07:01   95 H   17  135/69  92


 


 03/23/18 06:25   99 H    113/68 


 


 03/23/18 06:00   90   21  113/68  91


 


 03/23/18 05:00   89   21  118/63  93


 


 03/23/18 04:00   99 H  90  16  116/70  95


 


 03/23/18 03:13  98.8 F     


 


 03/23/18 03:12   97 H   16  107/64  96


 


 03/23/18 03:00   89   19  107/64  92


 


 03/23/18 02:00   87   21  102/78  97


 


 03/23/18 01:00   96 H   23  103/73  96


 


 03/23/18 00:00  98.4 F  94 H  103 H  22  115/72  96


 


 03/22/18 23:51   91 H   21  115/72  96


 


 03/22/18 23:00   88   22  115/72  99


 


 03/22/18 22:46   95 H   23  123/63  98


 


 03/22/18 22:00   103 H   22  123/63  98


 


 03/22/18 21:00   104 H   21  107/68  98


 


 03/22/18 20:01   107 H   29 H  131/50  95


 


 03/22/18 20:00  98 F   122 H  22   100


 


 03/22/18 19:45   118 H    


 


 03/22/18 19:18       100


 


 03/22/18 19:01   111 H   28 H  125/64  98


 


 03/22/18 18:00   122 H   20  138/66  98


 


 03/22/18 17:01   115 H   29 H  117/62  98


 


 03/22/18 16:01   106 H   21  117/62  98


 


 03/22/18 16:00  98.4 F     


 


 03/22/18 15:00   120 H   30 H  123/63  98


 


 03/22/18 14:53   135 H    


 


 03/22/18 14:00   137 H   26 H  121/65  95


 


 03/22/18 13:00   151 H   21  132/64 


 


 03/22/18 12:00  98.9 F  126 H   25 H  132/64  95


 


 03/22/18 11:00   126 H   24  141/63  96


 


 03/22/18 10:00   120 H   22  123/58  96














- Physical Examination


General: No Apparent Distress


HEENT: Positive: PERRL


Cardiac: Positive: irregularly irregular





- Labs and Meds


 CBC











  03/23/18 Range/Units





  03:58 


 


WBC  9.5  (4.5-11.0)  K/mm3


 


RBC  5.10 H  (3.65-5.03)  M/mm3


 


Hgb  15.2  (11.8-15.2)  gm/dl


 


Hct  46.9 H  (35.5-45.6)  %


 


Plt Count  135 L  (140-440)  K/mm3








 Comprehensive Metabolic Panel











  03/22/18 03/23/18 Range/Units





  17:41 03:58 


 


Sodium  141  141  (137-145)  mmol/L


 


Potassium  4.4  3.9  (3.6-5.0)  mmol/L


 


Chloride  103.8  106.3  ()  mmol/L


 


Carbon Dioxide  20 L  20 L  (22-30)  mmol/L


 


BUN  61 H  54 H  (9-20)  mg/dL


 


Creatinine  2.5 H  1.9 H  (0.8-1.5)  mg/dL


 


Glucose  315 H  243 H  ()  mg/dL


 


Calcium  8.6  8.7  (8.4-10.2)  mg/dL

## 2018-03-23 NOTE — PROGRESS NOTE
Assessment and Plan





Impression:


* Acute kidney injury secondary to multifactorial etiologies:  sepsis related 

ATN vs obstructive uropathy


* Right ureteral stone w/ obstruction, right hydronephrosis; s/p cystoscopy, 

urethrotomy and stent.


* Sepsis


* Atrial fibrillation w/ RVR


* Chronic anticoagulation


* Metabolic acidosis





Plan:


* Renal function improving.  Continue current management


* Abx per primary team


* Rate control per cardiology


* Urology following


* Avoid nephrotoxins


* Dose medications for renal function





Subjective


Date of service: 03/23/18


Principal diagnosis: Sepsis, Hydronephrosis


Interval history: 





Patient reports that he is feeling better.  He reports breathing has improved.





Objective





- Vital Signs


Vital signs: 


 Vital Signs - 12hr











  03/22/18 03/22/18 03/22/18





  22:46 23:00 23:51


 


Temperature   


 


Pulse Rate 95 H 88 91 H


 


Pulse Rate [   





From Monitor]   


 


Respiratory 23 22 21





Rate   


 


Blood Pressure 123/63 115/72 115/72


 


O2 Sat by Pulse 98 99 96





Oximetry   














  03/23/18 03/23/18 03/23/18





  00:00 01:00 02:00


 


Temperature 98.4 F  


 


Pulse Rate 94 H 96 H 87


 


Pulse Rate [ 103 H  





From Monitor]   


 


Respiratory 22 23 21





Rate   


 


Blood Pressure 115/72 103/73 102/78


 


O2 Sat by Pulse 96 96 97





Oximetry   














  03/23/18 03/23/18 03/23/18





  03:00 03:12 03:13


 


Temperature   98.8 F


 


Pulse Rate 89 97 H 


 


Pulse Rate [   





From Monitor]   


 


Respiratory 19 16 





Rate   


 


Blood Pressure 107/64 107/64 


 


O2 Sat by Pulse 92 96 





Oximetry   














  03/23/18 03/23/18 03/23/18





  04:00 05:00 06:00


 


Temperature   


 


Pulse Rate 99 H 89 90


 


Pulse Rate [ 90  





From Monitor]   


 


Respiratory 16 21 21





Rate   


 


Blood Pressure 116/70 118/63 113/68


 


O2 Sat by Pulse 95 93 91





Oximetry   














  03/23/18 03/23/18 03/23/18





  06:25 07:01 08:00


 


Temperature   97.3 F L


 


Pulse Rate 99 H 95 H 


 


Pulse Rate [   105 H





From Monitor]   


 


Respiratory  17 20





Rate   


 


Blood Pressure 113/68 135/69 


 


O2 Sat by Pulse  92 98





Oximetry   














  03/23/18 03/23/18 03/23/18





  08:01 09:01 09:15


 


Temperature   


 


Pulse Rate 117 H 106 H 114 H


 


Pulse Rate [   





From Monitor]   


 


Respiratory 22 25 H 27 H





Rate   


 


Blood Pressure 135/69 112/70 112/70


 


O2 Sat by Pulse  94 98





Oximetry   














  03/23/18 03/23/18 03/23/18





  09:20 09:31 09:45


 


Temperature   


 


Pulse Rate  103 H 115 H


 


Pulse Rate [   





From Monitor]   


 


Respiratory  24 32 H





Rate   


 


Blood Pressure  112/70 112/70


 


O2 Sat by Pulse 97 96 96





Oximetry   














  03/23/18 03/23/18 03/23/18





  10:00 10:01 10:09


 


Temperature   


 


Pulse Rate 98 H 108 H 106 H


 


Pulse Rate [   





From Monitor]   


 


Respiratory  23 





Rate   


 


Blood Pressure  112/70 122/69


 


O2 Sat by Pulse  97 





Oximetry   














  03/23/18





  10:15


 


Temperature 


 


Pulse Rate 109 H


 


Pulse Rate [ 





From Monitor] 


 


Respiratory 17





Rate 


 


Blood Pressure 122/69


 


O2 Sat by Pulse 97





Oximetry 














- General Appearance


General appearance: well-developed, well-nourished, obese


EENT: ATNC


Neck: no JVD


Respiratory: Present: Clear to Ascultation


Cardiology: irregular


Gastrointestinal: obese


Neurologic: no focal deficit, alert and oriented x3


Musculoskeletal: other (trace edema; chronic venous stasis changes)


Psychiatric: mood/affect appropriate, cooperative





- Lab





 03/23/18 03:58





 03/23/18 03:58


 Most recent lab results











Calcium  8.7 mg/dL (8.4-10.2)   03/23/18  03:58

## 2018-03-24 LAB
ANISOCYTOSIS BLD QL SMEAR: (no result)
BAND NEUTROPHILS # (MANUAL): 0.6 K/MM3
BUN SERPL-MCNC: 46 MG/DL (ref 9–20)
BUN/CREAT SERPL: 29 %
CALCIUM SERPL-MCNC: 8.9 MG/DL (ref 8.4–10.2)
HCT VFR BLD CALC: 49.6 % (ref 35.5–45.6)
HEMOLYSIS INDEX: 8
HGB BLD-MCNC: 16.4 GM/DL (ref 11.8–15.2)
MCH RBC QN AUTO: 30 PG (ref 28–32)
MCHC RBC AUTO-ENTMCNC: 33 % (ref 32–34)
MCV RBC AUTO: 91 FL (ref 84–94)
MYELOCYTES # (MANUAL): 0 K/MM3
PLATELET # BLD: 156 K/MM3 (ref 140–440)
PROMYELOCYTES # (MANUAL): 0 K/MM3
RBC # BLD AUTO: 5.46 M/MM3 (ref 3.65–5.03)
TOTAL CELLS COUNTED BLD: 100

## 2018-03-24 RX ADMIN — DILTIAZEM HYDROCHLORIDE SCH MG: 180 CAPSULE, COATED, EXTENDED RELEASE ORAL at 05:26

## 2018-03-24 RX ADMIN — Medication SCH ML: at 22:34

## 2018-03-24 RX ADMIN — HEPARIN SODIUM SCH UNIT: 5000 INJECTION, SOLUTION INTRAVENOUS; SUBCUTANEOUS at 22:31

## 2018-03-24 RX ADMIN — OXYCODONE HYDROCHLORIDE AND ACETAMINOPHEN SCH MG: 500 TABLET ORAL at 22:31

## 2018-03-24 RX ADMIN — FUROSEMIDE SCH MG: 40 TABLET ORAL at 05:25

## 2018-03-24 RX ADMIN — INSULIN GLARGINE SCH UNITS: 100 INJECTION, SOLUTION SUBCUTANEOUS at 09:27

## 2018-03-24 RX ADMIN — EZETIMIBE SCH MG: 10 TABLET ORAL at 22:31

## 2018-03-24 RX ADMIN — Medication SCH EACH: at 09:28

## 2018-03-24 RX ADMIN — Medication SCH MCG: at 09:28

## 2018-03-24 RX ADMIN — OMEGA-3 FATTY ACIDS CAP 1000 MG SCH MG: 1000 CAP at 09:27

## 2018-03-24 RX ADMIN — ALLOPURINOL SCH MG: 100 TABLET ORAL at 09:27

## 2018-03-24 RX ADMIN — FUROSEMIDE SCH MG: 40 TABLET ORAL at 17:38

## 2018-03-24 RX ADMIN — MULTIPLE VITAMINS W/ MINERALS TAB SCH EACH: TAB at 09:28

## 2018-03-24 RX ADMIN — HEPARIN SODIUM SCH: 5000 INJECTION, SOLUTION INTRAVENOUS; SUBCUTANEOUS at 17:42

## 2018-03-24 RX ADMIN — DOCUSATE SODIUM SCH MG: 100 CAPSULE, LIQUID FILLED ORAL at 22:31

## 2018-03-24 RX ADMIN — POTASSIUM CHLORIDE SCH MEQ: 1500 TABLET, EXTENDED RELEASE ORAL at 09:28

## 2018-03-24 RX ADMIN — OXYCODONE HYDROCHLORIDE AND ACETAMINOPHEN SCH MG: 500 TABLET ORAL at 09:28

## 2018-03-24 RX ADMIN — LISINOPRIL SCH MG: 40 TABLET ORAL at 09:28

## 2018-03-24 RX ADMIN — PRAVASTATIN SODIUM SCH MG: 80 TABLET ORAL at 22:31

## 2018-03-24 RX ADMIN — Medication SCH ML: at 12:45

## 2018-03-24 RX ADMIN — DILTIAZEM HYDROCHLORIDE SCH MG: 180 CAPSULE, COATED, EXTENDED RELEASE ORAL at 17:40

## 2018-03-24 RX ADMIN — HEPARIN SODIUM SCH UNIT: 5000 INJECTION, SOLUTION INTRAVENOUS; SUBCUTANEOUS at 05:26

## 2018-03-24 NOTE — PROGRESS NOTE
Assessment and Plan





- Patient Problems


(1) Atrial fibrillation


Current Visit: Yes   Status: Acute   


Qualifiers: 


   Atrial fibrillation type: chronic   Qualified Code(s): I48.2 - Chronic 

atrial fibrillation   


Plan to address problem: 


Continue medical therapy for rate control and oral anticoagulation depending on 

surgical status.








Subjective


Date of service: 03/24/18


Principal diagnosis: Severe Sepsis with Shock; Hydronephrosis with Obstructive 

Uropathy; NATALIE


Interval history: 





Patient looks and feels better, no cardiac complaints.





Objective


 Vital Signs











  Temp Pulse Resp BP BP Pulse Ox


 


 03/24/18 13:26   102 H    


 


 03/24/18 12:21  98.4 F  87  20   132/68  96


 


 03/24/18 09:46  98.9 F  65  18   130/75  96


 


 03/24/18 05:26   114 H   116/74  


 


 03/24/18 05:23  97.6 F  111 H  22  116/74   98


 


 03/24/18 01:47   101 H    


 


 03/24/18 00:31  97.3 F L  101 H  22  122/73   99


 


 03/23/18 22:00       98


 


 03/23/18 19:52  97.8 F  102 H  20  116/62   98


 


 03/23/18 17:31   117 H  25 H  135/77   96


 


 03/23/18 17:21   108 H  20  135/77   97


 


 03/23/18 17:11   106 H  27 H  135/77   96


 


 03/23/18 17:01   103 H  27 H  135/77   94


 


 03/23/18 16:02   107 H   135/77  


 


 03/23/18 16:01   107 H  28 H  92/47  


 


 03/23/18 15:58   108 H  19  130/87  


 


 03/23/18 14:00   113 H  27 H  130/87   96














- Physical Examination


General: No Apparent Distress


HEENT: Positive: PERRL


Neck: Positive: neck supple


Cardiac: Positive: Reg Rate and Rhythm


Lungs: Positive: Decreased Breath Sounds


Neuro: Positive: Grossly Intact


Abdomen: Positive: Soft


Skin: Positive: Clear


Extremities: Absent: edema





- Labs and Meds


 CBC











  03/24/18 Range/Units





  04:54 


 


WBC  9.0  (4.5-11.0)  K/mm3


 


RBC  5.46 H  (3.65-5.03)  M/mm3


 


Hgb  16.4 H  (11.8-15.2)  gm/dl


 


Hct  49.6 H  (35.5-45.6)  %


 


Plt Count  156  (140-440)  K/mm3








 Comprehensive Metabolic Panel











  03/24/18 Range/Units





  04:54 


 


Sodium  146 H  (137-145)  mmol/L


 


Potassium  3.9  (3.6-5.0)  mmol/L


 


Chloride  107.5 H  ()  mmol/L


 


Carbon Dioxide  22  (22-30)  mmol/L


 


BUN  46 H  (9-20)  mg/dL


 


Creatinine  1.6 H  (0.8-1.5)  mg/dL


 


Glucose  240 H  ()  mg/dL


 


Calcium  8.9  (8.4-10.2)  mg/dL














- Allied health notes


Allied health notes reviewed: nursing

## 2018-03-24 NOTE — PROGRESS NOTE
Assessment and Plan


Impression:


* Acute kidney injury secondary to multifactorial etiologies:  sepsis related 

ATN vs obstructive uropathy


* Right ureteral stone w/ obstruction, right hydronephrosis; s/p cystoscopy, 

urethrotomy and stent.


* Sepsis


* Atrial fibrillation w/ RVR


* Chronic anticoagulation


* Metabolic acidosis





Plan:


* Renal function improving.  Continue current management


* Abx per primary team


* Rate control per cardiology


* Urology following


* Avoid nephrotoxins


* Dose medications for renal function








Subjective


Date of service: 03/24/18


Principal diagnosis: Severe Sepsis with Shock; Hydronephrosis with Obstructive 

Uropathy; NATALIE


Interval history: 


Patient is comfortable today.  Currently has an indwelling Quiñones in place.  

Urine appears to be bloody.  His shortness of breath is improving.  Denies any 

nausea or vomiting.








Objective





- Vital Signs


Vital signs: 


 Vital Signs - 12hr











  03/24/18 03/24/18 03/24/18





  01:47 05:23 05:26


 


Temperature  97.6 F 


 


Pulse Rate 101 H 111 H 114 H


 


Respiratory  22 





Rate   


 


Blood Pressure  116/74 116/74


 


Blood Pressure   





[Right]   


 


O2 Sat by Pulse  98 





Oximetry   














  03/24/18 03/24/18 03/24/18





  09:46 12:21 13:26


 


Temperature 98.9 F 98.4 F 


 


Pulse Rate 65 87 102 H


 


Respiratory 18 20 





Rate   


 


Blood Pressure   


 


Blood Pressure 130/75 132/68 





[Right]   


 


O2 Sat by Pulse 96 96 





Oximetry   














- General Appearance


General appearance: well-developed, well-nourished, appears stated age, obese


EENT: PERRL, mucous membranes moist


Neck: no JVD, no thyromegaly, no carotid bruit, supple


Respiratory: Present: Clear to Ascultation


Cardiology: irregularly irregular, normal heart rate, S1S2, no murmurs


Gastrointestinal: normal, normoactive bowel sounds


Integumentary: other (dressing left leg . 1+ edema)





- Lab





 03/24/18 04:54





 03/24/18 04:54


 Most recent lab results











Calcium  8.9 mg/dL (8.4-10.2)   03/24/18  04:54

## 2018-03-24 NOTE — PROGRESS NOTE
Assessment and Plan


Assessment and plan: 





Sepsis.  Continue IV antibiotics and IV fluid hydration.  Blood and urine 

cultures are negative.





Paroxysmal Atrial fibrillation with RVR.  Cardiology following.  Cartia XT 

resume.  Resume eliquis when okay with cardiology/surgeon.





Acute on chronic renal failure.  Creatinine continues to Improve.  Etiology is 

multifactorial secondary to acute kidney injury from sepsis/ATN/vasomotor 

nephropathy/volume depletion and obstructive uropathy from ureteral stone/

stricture.  Continue IV fluid hydration and follow BMP closely.  Nephrology 

following.





Obstructive uropathy/hydronephrosis.  Etiology secondary to Right ureteral stone

/stricture.  Patient is status post cystoscopic stent placement.





Diabetes mellitus type 2.  Continue Accu-Cheks and sliding scale insulin.





Hypertension.  Resume antihypertensive medications.




















History


Interval history: 





No new issues overnight.





Hospitalist Physical





- Constitutional


Vitals: 


 











Temp Pulse Resp BP Pulse Ox


 


 98.4 F   87   20   132/68   96 


 


 03/24/18 12:21  03/24/18 12:21  03/24/18 12:21  03/24/18 12:21  03/24/18 12:21











General appearance: Present: no acute distress





- EENT


Eyes: Present: PERRL, EOM intact


ENT: hearing intact, clear oral mucosa, dentition normal





- Neck


Neck: Present: supple, normal ROM





- Respiratory


Respiratory effort: normal


Respiratory: bilateral: CTA





- Cardiovascular


Rhythm: regular


Heart Sounds: Present: S1 & S2.  Absent: gallop, rub





- Extremities


Extremities: no ischemia, No edema, Full ROM





- Abdominal


General gastrointestinal: soft, non-tender, non-distended, normal bowel sounds





- Integumentary


Integumentary: Present: clear, warm, dry





- Neurologic


Neurologic: CNII-XII intact, moves all extremities





Results





- Labs


CBC & Chem 7: 


 03/24/18 04:54





 03/24/18 04:54


Labs: 


 Laboratory Last Values











WBC  9.0 K/mm3 (4.5-11.0)   03/24/18  04:54    


 


RBC  5.46 M/mm3 (3.65-5.03)  H  03/24/18  04:54    


 


Hgb  16.4 gm/dl (11.8-15.2)  H  03/24/18  04:54    


 


Hct  49.6 % (35.5-45.6)  H  03/24/18  04:54    


 


MCV  91 fl (84-94)   03/24/18  04:54    


 


MCH  30 pg (28-32)   03/24/18  04:54    


 


MCHC  33 % (32-34)   03/24/18  04:54    


 


RDW  16.1 % (13.2-15.2)  H  03/24/18  04:54    


 


Plt Count  156 K/mm3 (140-440)   03/24/18  04:54    


 


Lymph % (Auto)  6.5 % (13.4-35.0)  L  03/21/18  11:27    


 


Mono % (Auto)  Np   03/24/18  04:54    


 


Eos % (Auto)  0.2 % (0.0-4.3)   03/21/18  11:27    


 


Baso % (Auto)  0.3 % (0.0-1.8)   03/21/18  11:27    


 


Lymph #  0.8 K/mm3 (1.2-5.4)  L  03/21/18  11:27    


 


Mono #  1.8 K/mm3 (0.0-0.8)  H  03/21/18  11:27    


 


Eos #  0.0 K/mm3 (0.0-0.4)   03/21/18  11:27    


 


Baso #  0.0 K/mm3 (0.0-0.1)   03/21/18  11:27    


 


Add Manual Diff  Complete   03/24/18  04:54    


 


Total Counted  100   03/24/18  04:54    


 


Seg Neutrophils %  78.3 % (40.0-70.0)  H  03/21/18  11:27    


 


Seg Neuts % (Manual)  61.0 % (40.0-70.0)   03/24/18  04:54    


 


Band Neutrophils %  7.0 %  03/24/18  04:54    


 


Lymphocytes % (Manual)  13.0 % (13.4-35.0)  L  03/24/18  04:54    


 


Reactive Lymphs % (Man)  0 %  03/24/18  04:54    


 


Monocytes % (Manual)  14.0 % (0.0-7.3)  H  03/24/18  04:54    


 


Eosinophils % (Manual)  5.0 % (0.0-4.3)  H  03/24/18  04:54    


 


Basophils % (Manual)  0 % (0.0-1.8)   03/24/18  04:54    


 


Metamyelocytes %  0 %  03/24/18  04:54    


 


Myelocytes %  0 %  03/24/18  04:54    


 


Promyelocytes %  0 %  03/24/18  04:54    


 


Blast Cells %  0 %  03/24/18  04:54    


 


Nucleated RBC %  Not Reportable   03/24/18  04:54    


 


Seg Neutrophils #  9.5 K/mm3 (1.8-7.7)  H  03/21/18  11:27    


 


Seg Neutrophils # Man  5.5 K/mm3 (1.8-7.7)   03/24/18  04:54    


 


Band Neutrophils #  0.6 K/mm3  03/24/18  04:54    


 


Lymphocytes # (Manual)  1.2 K/mm3 (1.2-5.4)   03/24/18  04:54    


 


Abs React Lymphs (Man)  0.0 K/mm3  03/24/18  04:54    


 


Monocytes # (Manual)  1.3 K/mm3 (0.0-0.8)  H  03/24/18  04:54    


 


Eosinophils # (Manual)  0.5 K/mm3 (0.0-0.4)  H  03/24/18  04:54    


 


Basophils # (Manual)  0.0 K/mm3 (0.0-0.1)   03/24/18  04:54    


 


Metamyelocytes #  0.0 K/mm3  03/24/18  04:54    


 


Myelocytes #  0.0 K/mm3  03/24/18  04:54    


 


Promyelocytes #  0.0 K/mm3  03/24/18  04:54    


 


Blast Cells #  0.0 K/mm3  03/24/18  04:54    


 


WBC Morphology  Not Reportable   03/24/18  04:54    


 


Hypersegmented Neuts  Not Reportable   03/24/18  04:54    


 


Hyposegmented Neuts  Not Reportable   03/24/18  04:54    


 


Hypogranular Neuts  Not Reportable   03/24/18  04:54    


 


Smudge Cells  Not Reportable   03/24/18  04:54    


 


Toxic Granulation  Not Reportable   03/24/18  04:54    


 


Toxic Vacuolation  Not Reportable   03/24/18  04:54    


 


Dohle Bodies  Not Reportable   03/24/18  04:54    


 


Pelger-Huet Anomaly  Not Reportable   03/24/18  04:54    


 


Jax Rods  Not Reportable   03/24/18  04:54    


 


Platelet Estimate  Appears normal   03/24/18  04:54    


 


Clumped Platelets  Not Reportable   03/24/18  04:54    


 


Plt Clumps, EDTA  Not Reportable   03/24/18  04:54    


 


Large Platelets  Not Reportable   03/24/18  04:54    


 


Giant Platelets  Not Reportable   03/24/18  04:54    


 


Platelet Satelliting  Not Reportable   03/24/18  04:54    


 


Plt Morphology Comment  Not Reportable   03/24/18  04:54    


 


RBC Morphology  Not Reportable   03/24/18  04:54    


 


Dimorphic RBCs  Not Reportable   03/24/18  04:54    


 


Polychromasia  Not Reportable   03/24/18  04:54    


 


Hypochromasia  Not Reportable   03/24/18  04:54    


 


Poikilocytosis  Not Reportable   03/24/18  04:54    


 


Anisocytosis  1+   03/24/18  04:54    


 


Microcytosis  Not Reportable   03/24/18  04:54    


 


Macrocytosis  Not Reportable   03/24/18  04:54    


 


Spherocytes  Not Reportable   03/24/18  04:54    


 


Pappenheimer Bodies  Not Reportable   03/24/18  04:54    


 


Sickle Cells  Not Reportable   03/24/18  04:54    


 


Target Cells  Not Reportable   03/24/18  04:54    


 


Tear Drop Cells  Not Reportable   03/24/18  04:54    


 


Ovalocytes  Not Reportable   03/24/18  04:54    


 


Helmet Cells  Not Reportable   03/24/18  04:54    


 


Torres-Abie Bodies  Not Reportable   03/24/18  04:54    


 


Cabot Rings  Not Reportable   03/24/18  04:54    


 


Lasara Cells  Not Reportable   03/24/18  04:54    


 


Bite Cells  Not Reportable   03/24/18  04:54    


 


Crenated Cell  Not Reportable   03/24/18  04:54    


 


Elliptocytes  Not Reportable   03/24/18  04:54    


 


Acanthocytes (Spur)  Not Reportable   03/24/18  04:54    


 


Rouleaux  Not Reportable   03/24/18  04:54    


 


Hemoglobin C Crystals  Not Reportable   03/24/18  04:54    


 


Schistocytes  Not Reportable   03/24/18  04:54    


 


Malaria parasites  Not Reportable   03/24/18  04:54    


 


Beau Bodies  Not Reportable   03/24/18  04:54    


 


Hem Pathologist Commnt  No   03/24/18  04:54    


 


PT  19.9 Sec. (12.2-14.9)  H  03/21/18  16:29    


 


INR  1.59  (0.87-1.13)  H  03/21/18  16:29    


 


APTT  43.9 Sec. (24.2-36.6)  H  03/21/18  16:29    


 


VBG pH  7.316  (7.320-7.420)  L  03/21/18  11:27    


 


Sodium  146 mmol/L (137-145)  H  03/24/18  04:54    


 


Potassium  3.9 mmol/L (3.6-5.0)   03/24/18  04:54    


 


Chloride  107.5 mmol/L ()  H  03/24/18  04:54    


 


Carbon Dioxide  22 mmol/L (22-30)   03/24/18  04:54    


 


Anion Gap  20 mmol/L  03/24/18  04:54    


 


BUN  46 mg/dL (9-20)  H  03/24/18  04:54    


 


Creatinine  1.6 mg/dL (0.8-1.5)  H  03/24/18  04:54    


 


Estimated GFR  43 ml/min  03/24/18  04:54    


 


BUN/Creatinine Ratio  29 %  03/24/18  04:54    


 


Glucose  240 mg/dL ()  H  03/24/18  04:54    


 


POC Glucose  223  ()  H  03/24/18  05:32    


 


Lactic Acid  2.10 mmol/L (0.7-2.0)  H*  03/21/18  20:49    


 


Calcium  8.9 mg/dL (8.4-10.2)   03/24/18  04:54    


 


Total Bilirubin  0.90 mg/dL (0.1-1.2)   03/21/18  11:27    


 


AST  75 units/L (5-40)  H  03/21/18  11:27    


 


ALT  58 units/L (7-56)  H  03/21/18  11:27    


 


Alkaline Phosphatase  142 units/L ()  H  03/21/18  11:27    


 


C-Reactive Protein  12.40 mg/dL (0.00-1.30)  H  03/21/18  11:27    


 


Total Protein  6.5 g/dL (6.3-8.2)   03/21/18  11:27    


 


Albumin  2.8 g/dL (3.9-5)  L  03/21/18  11:27    


 


Albumin/Globulin Ratio  0.8 %  03/21/18  11:27    


 


Urine Color  Zamzam  (Yellow)   03/21/18  13:49    


 


Urine Turbidity  Clear  (Clear)   03/21/18  13:49    


 


Urine pH  5.0  (5.0-7.0)   03/21/18  13:49    


 


Ur Specific Gravity  1.010  (1.003-1.030)   03/21/18  13:49    


 


Urine Protein  30 mg/dl mg/dL (Negative)   03/21/18  13:49    


 


Urine Glucose (UA)  >=500 mg/dL (Negative)   03/21/18  13:49    


 


Urine Ketones  Neg mg/dL (Negative)   03/21/18  13:49    


 


Urine Blood  Lg  (Negative)   03/21/18  13:49    


 


Urine Nitrite  Neg  (Negative)   03/21/18  13:49    


 


Urine Bilirubin  Neg  (Negative)   03/21/18  13:49    


 


Urine Urobilinogen  < 2.0 mg/dL (<2.0)   03/21/18  13:49    


 


Ur Leukocyte Esterase  Tr  (Negative)   03/21/18  13:49    


 


Urine WBC (Auto)  37.0 /HPF (0.0-6.0)  H  03/21/18  13:49    


 


Urine RBC (Auto)  > 182.0 /HPF (0.0-6.0)   03/21/18  13:49    


 


U Epithel Cells (Auto)  1.0 /HPF (0-13.0)   03/21/18  13:49    


 


Urine Bacteria (Auto)  1+ /HPF (Negative)   03/21/18  13:49    


 


Urine Mucus  Few /HPF  03/21/18  13:49

## 2018-03-24 NOTE — PROGRESS NOTE
Assessment and Plan





Patient alert, awake. Weak. Resting on nasal canula. O2 saturation 98% on 3 

litres O2.Complaining slight cough. No acute respiratory distress.





- Patient Problems


(1) Metabolic acidosis


Current Visit: Yes   Status: Acute   


Plan to address problem: 


Obtaining ABGs








(2) Acute on chronic renal failure


Current Visit: Yes   Status: Acute   


Qualifiers: 


   Acute renal failure type: with acute tubular necrosis 


Plan to address problem: 


Management as per nephrology.








(3) Atrial fibrillation


Current Visit: Yes   Status: Acute   


Qualifiers: 


   Atrial fibrillation type: chronic   Qualified Code(s): I48.2 - Chronic 

atrial fibrillation   


Plan to address problem: 


Management as per primary and cardiology.








(4) Diabetes


Current Visit: Yes   Status: Acute   


Plan to address problem: 


Management as per primary care.








(5) Morbid (severe) obesity due to excess calories


Current Visit: Yes   Status: Acute   


Plan to address problem: 


Weight reduction diet.








(6) Tobacco use


Current Visit: Yes   Status: Acute   


Plan to address problem: 


History of tobacco use 40 years. Stopped smoking 8 years ago.


Recommend PFTs as out patient.








(7) Sleep apnea with use of continuous positive airway pressure (CPAP)


Current Visit: Yes   Status: Acute   


Plan to address problem: 


Patient is on BIPAP during night time.








Subjective


Date of service: 03/24/18


Principal diagnosis: Severe Sepsis with Shock; Hydronephrosis with Obstructive 

Uropathy; NATALIE


Interval history: 





Patient alert, awake. Weak. Resting on nasal canula. O2 saturation 98% on 3 

litres O2.Complaining slight cough. No acute respiratory distress.





Objective


 Vital Signs - 12hr











  03/24/18 03/24/18 03/24/18





  00:31 01:47 05:23


 


Temperature 97.3 F L  97.6 F


 


Pulse Rate 101 H 101 H 111 H


 


Respiratory 22  22





Rate   


 


Blood Pressure 122/73  116/74


 


Blood Pressure   





[Right]   


 


O2 Sat by Pulse 99  98





Oximetry   














  03/24/18 03/24/18





  05:26 09:46


 


Temperature  98.9 F


 


Pulse Rate 114 H 65


 


Respiratory  18





Rate  


 


Blood Pressure 116/74 


 


Blood Pressure  130/75





[Right]  


 


O2 Sat by Pulse  96





Oximetry  











Constitutional: no acute distress, other (obese)


Eyes: non-icteric


ENT: oropharynx moist, other (large neck circumference)


Neck: supple, no lymphadenopathy, no JVD, other (no thyromegally; mallampatti 4)


Effort: normal


Ascultation: Bilateral: diminished breath sounds, rhonchi (inspiratory in bases 

L>R)


Percussion: Bilateral: not dull


Cardiovascular: irregular rhythm, other (S1,S2, no murmurs, gallops or rubs)


Gastrointestinal: normoactive bowel sounds, soft, non-tender, non-distended, 

other (No hepatoslenomegaly)


Integumentary: rash (stasis dermatitis to legs)


Extremities: no cyanosis, no edema, pulses normal, other (Chronic venous stasis 

dermatitis)


Neurologic: normal mental status, non-focal exam, pupils equal and round, CN II-

XII normal, motor strength normal and


Psychiatric: mood appropriate, affect normal


CBC and BMP: 


 03/24/18 04:54





 03/24/18 04:54


ABG, PT/INR, D-dimer: 


PT/INR, D-dimer











PT  19.9 Sec. (12.2-14.9)  H  03/21/18  16:29    


 


INR  1.59  (0.87-1.13)  H  03/21/18  16:29    








Abnormal lab findings: 


 Abnormal Labs











  03/21/18 03/21/18 03/21/18





  11:27 11:27 11:27


 


WBC  12.1 H  


 


RBC  5.37 H  


 


Hgb  16.3 H  


 


Hct  48.7 H  


 


RDW  15.7 H  


 


Plt Count  136 L  


 


Lymph % (Auto)  6.5 L  


 


Mono % (Auto)  14.7 H  


 


Lymph #  0.8 L  


 


Mono #  1.8 H  


 


Seg Neutrophils %  78.3 H  


 


Lymphocytes % (Manual)   


 


Monocytes % (Manual)   


 


Eosinophils % (Manual)   


 


Seg Neutrophils #  9.5 H  


 


Seg Neutrophils # Man   


 


Lymphocytes # (Manual)   


 


Monocytes # (Manual)   


 


Eosinophils # (Manual)   


 


PT   20.5 H 


 


INR   1.65 H 


 


APTT   40.2 H 


 


VBG pH   


 


Sodium    133 L


 


Chloride    93.9 L


 


Carbon Dioxide    20 L


 


BUN    79 H


 


Creatinine    3.8 H


 


Glucose    153 H


 


POC Glucose   


 


Lactic Acid   


 


Calcium   


 


AST    75 H


 


ALT    58 H


 


Alkaline Phosphatase    142 H


 


C-Reactive Protein   


 


Albumin    2.8 L


 


Urine WBC (Auto)   














  03/21/18 03/21/18 03/21/18





  11:27 11:27 11:27


 


WBC   


 


RBC   


 


Hgb   


 


Hct   


 


RDW   


 


Plt Count   


 


Lymph % (Auto)   


 


Mono % (Auto)   


 


Lymph #   


 


Mono #   


 


Seg Neutrophils %   


 


Lymphocytes % (Manual)   


 


Monocytes % (Manual)   


 


Eosinophils % (Manual)   


 


Seg Neutrophils #   


 


Seg Neutrophils # Man   


 


Lymphocytes # (Manual)   


 


Monocytes # (Manual)   


 


Eosinophils # (Manual)   


 


PT   


 


INR   


 


APTT   


 


VBG pH   7.316 L 


 


Sodium   


 


Chloride   


 


Carbon Dioxide   


 


BUN   


 


Creatinine   


 


Glucose   


 


POC Glucose   


 


Lactic Acid  2.30 H*  


 


Calcium   


 


AST   


 


ALT   


 


Alkaline Phosphatase   


 


C-Reactive Protein    12.40 H


 


Albumin   


 


Urine WBC (Auto)   














  03/21/18 03/21/18 03/21/18





  13:49 16:29 16:29


 


WBC    11.7 H


 


RBC   


 


Hgb   


 


Hct   


 


RDW    15.9 H


 


Plt Count    121 L


 


Lymph % (Auto)   


 


Mono % (Auto)   


 


Lymph #   


 


Mono #   


 


Seg Neutrophils %   


 


Lymphocytes % (Manual)    5.0 L


 


Monocytes % (Manual)    19.0 H


 


Eosinophils % (Manual)   


 


Seg Neutrophils #   


 


Seg Neutrophils # Man    8.2 H


 


Lymphocytes # (Manual)    0.6 L


 


Monocytes # (Manual)    2.2 H


 


Eosinophils # (Manual)   


 


PT   


 


INR   


 


APTT   


 


VBG pH   


 


Sodium   


 


Chloride   


 


Carbon Dioxide   


 


BUN   


 


Creatinine   


 


Glucose   


 


POC Glucose   


 


Lactic Acid   2.20 H* 


 


Calcium   


 


AST   


 


ALT   


 


Alkaline Phosphatase   


 


C-Reactive Protein   


 


Albumin   


 


Urine WBC (Auto)  37.0 H  














  03/21/18 03/21/18 03/21/18





  16:29 17:22 20:49


 


WBC   


 


RBC   


 


Hgb   


 


Hct   


 


RDW   


 


Plt Count   


 


Lymph % (Auto)   


 


Mono % (Auto)   


 


Lymph #   


 


Mono #   


 


Seg Neutrophils %   


 


Lymphocytes % (Manual)   


 


Monocytes % (Manual)   


 


Eosinophils % (Manual)   


 


Seg Neutrophils #   


 


Seg Neutrophils # Man   


 


Lymphocytes # (Manual)   


 


Monocytes # (Manual)   


 


Eosinophils # (Manual)   


 


PT  19.9 H  


 


INR  1.59 H  


 


APTT  43.9 H  


 


VBG pH   


 


Sodium   


 


Chloride   


 


Carbon Dioxide   18 L 


 


BUN   70 H 


 


Creatinine   3.3 H 


 


Glucose   


 


POC Glucose   


 


Lactic Acid    2.10 H*


 


Calcium   8.1 L 


 


AST   


 


ALT   


 


Alkaline Phosphatase   


 


C-Reactive Protein   


 


Albumin   


 


Urine WBC (Auto)   














  03/21/18 03/22/18 03/22/18





  20:49 11:40 17:25


 


WBC   


 


RBC   


 


Hgb   


 


Hct   


 


RDW   


 


Plt Count   


 


Lymph % (Auto)   


 


Mono % (Auto)   


 


Lymph #   


 


Mono #   


 


Seg Neutrophils %   


 


Lymphocytes % (Manual)   


 


Monocytes % (Manual)   


 


Eosinophils % (Manual)   


 


Seg Neutrophils #   


 


Seg Neutrophils # Man   


 


Lymphocytes # (Manual)   


 


Monocytes # (Manual)   


 


Eosinophils # (Manual)   


 


PT   


 


INR   


 


APTT   


 


VBG pH   


 


Sodium  135 L  


 


Chloride   


 


Carbon Dioxide  15 L  


 


BUN  70 H  


 


Creatinine  3.1 H  


 


Glucose  108 H  


 


POC Glucose   169 H  313 H


 


Lactic Acid   


 


Calcium   


 


AST   


 


ALT   


 


Alkaline Phosphatase   


 


C-Reactive Protein   


 


Albumin   


 


Urine WBC (Auto)   














  03/22/18 03/23/18 03/23/18





  17:41 00:11 03:58


 


WBC   


 


RBC    5.10 H


 


Hgb   


 


Hct    46.9 H


 


RDW    15.9 H


 


Plt Count    135 L


 


Lymph % (Auto)   


 


Mono % (Auto)   


 


Lymph #   


 


Mono #   


 


Seg Neutrophils %   


 


Lymphocytes % (Manual)    9.0 L


 


Monocytes % (Manual)    14.0 H


 


Eosinophils % (Manual)   


 


Seg Neutrophils #   


 


Seg Neutrophils # Man   


 


Lymphocytes # (Manual)    0.9 L


 


Monocytes # (Manual)    1.3 H


 


Eosinophils # (Manual)   


 


PT   


 


INR   


 


APTT   


 


VBG pH   


 


Sodium   


 


Chloride   


 


Carbon Dioxide  20 L  


 


BUN  61 H  


 


Creatinine  2.5 H  


 


Glucose  315 H  


 


POC Glucose   277 H 


 


Lactic Acid   


 


Calcium   


 


AST   


 


ALT   


 


Alkaline Phosphatase   


 


C-Reactive Protein   


 


Albumin   


 


Urine WBC (Auto)   














  03/23/18 03/23/18 03/23/18





  03:58 05:02 11:35


 


WBC   


 


RBC   


 


Hgb   


 


Hct   


 


RDW   


 


Plt Count   


 


Lymph % (Auto)   


 


Mono % (Auto)   


 


Lymph #   


 


Mono #   


 


Seg Neutrophils %   


 


Lymphocytes % (Manual)   


 


Monocytes % (Manual)   


 


Eosinophils % (Manual)   


 


Seg Neutrophils #   


 


Seg Neutrophils # Man   


 


Lymphocytes # (Manual)   


 


Monocytes # (Manual)   


 


Eosinophils # (Manual)   


 


PT   


 


INR   


 


APTT   


 


VBG pH   


 


Sodium   


 


Chloride   


 


Carbon Dioxide  20 L  


 


BUN  54 H  


 


Creatinine  1.9 H  


 


Glucose  243 H  


 


POC Glucose   243 H  288 H


 


Lactic Acid   


 


Calcium   


 


AST   


 


ALT   


 


Alkaline Phosphatase   


 


C-Reactive Protein   


 


Albumin   


 


Urine WBC (Auto)   














  03/23/18 03/24/18 03/24/18





  17:18 04:54 04:54


 


WBC   


 


RBC   5.46 H 


 


Hgb   16.4 H 


 


Hct   49.6 H 


 


RDW   16.1 H 


 


Plt Count   


 


Lymph % (Auto)   


 


Mono % (Auto)   


 


Lymph #   


 


Mono #   


 


Seg Neutrophils %   


 


Lymphocytes % (Manual)   13.0 L 


 


Monocytes % (Manual)   14.0 H 


 


Eosinophils % (Manual)   5.0 H 


 


Seg Neutrophils #   


 


Seg Neutrophils # Man   


 


Lymphocytes # (Manual)   


 


Monocytes # (Manual)   1.3 H 


 


Eosinophils # (Manual)   0.5 H 


 


PT   


 


INR   


 


APTT   


 


VBG pH   


 


Sodium    146 H


 


Chloride    107.5 H


 


Carbon Dioxide   


 


BUN    46 H


 


Creatinine    1.6 H


 


Glucose    240 H


 


POC Glucose  275 H  


 


Lactic Acid   


 


Calcium   


 


AST   


 


ALT   


 


Alkaline Phosphatase   


 


C-Reactive Protein   


 


Albumin   


 


Urine WBC (Auto)   














  03/24/18





  05:32


 


WBC 


 


RBC 


 


Hgb 


 


Hct 


 


RDW 


 


Plt Count 


 


Lymph % (Auto) 


 


Mono % (Auto) 


 


Lymph # 


 


Mono # 


 


Seg Neutrophils % 


 


Lymphocytes % (Manual) 


 


Monocytes % (Manual) 


 


Eosinophils % (Manual) 


 


Seg Neutrophils # 


 


Seg Neutrophils # Man 


 


Lymphocytes # (Manual) 


 


Monocytes # (Manual) 


 


Eosinophils # (Manual) 


 


PT 


 


INR 


 


APTT 


 


VBG pH 


 


Sodium 


 


Chloride 


 


Carbon Dioxide 


 


BUN 


 


Creatinine 


 


Glucose 


 


POC Glucose  223 H


 


Lactic Acid 


 


Calcium 


 


AST 


 


ALT 


 


Alkaline Phosphatase 


 


C-Reactive Protein 


 


Albumin 


 


Urine WBC (Auto) 











Chest x-ray: report reviewed (No acute cardiopulmonary process.), image reviewed


Allied health notes reviewed: nursing

## 2018-03-25 LAB
BASOPHILS # (AUTO): 0.1 K/MM3 (ref 0–0.1)
BASOPHILS NFR BLD AUTO: 0.6 % (ref 0–1.8)
BUN SERPL-MCNC: 43 MG/DL (ref 9–20)
BUN/CREAT SERPL: 29 %
CALCIUM SERPL-MCNC: 8.9 MG/DL (ref 8.4–10.2)
EOSINOPHIL # BLD AUTO: 0.3 K/MM3 (ref 0–0.4)
EOSINOPHIL NFR BLD AUTO: 2.7 % (ref 0–4.3)
HCT VFR BLD CALC: 47.5 % (ref 35.5–45.6)
HEMOLYSIS INDEX: 3
HGB BLD-MCNC: 15.9 GM/DL (ref 11.8–15.2)
LYMPHOCYTES # BLD AUTO: 1.4 K/MM3 (ref 1.2–5.4)
LYMPHOCYTES NFR BLD AUTO: 13.8 % (ref 13.4–35)
MCH RBC QN AUTO: 31 PG (ref 28–32)
MCHC RBC AUTO-ENTMCNC: 34 % (ref 32–34)
MCV RBC AUTO: 91 FL (ref 84–94)
MONOCYTES # (AUTO): 1.2 K/MM3 (ref 0–0.8)
MONOCYTES % (AUTO): 11.7 % (ref 0–7.3)
PLATELET # BLD: 170 K/MM3 (ref 140–440)
RBC # BLD AUTO: 5.21 M/MM3 (ref 3.65–5.03)

## 2018-03-25 RX ADMIN — EZETIMIBE SCH MG: 10 TABLET ORAL at 21:24

## 2018-03-25 RX ADMIN — Medication SCH ML: at 21:25

## 2018-03-25 RX ADMIN — FUROSEMIDE SCH MG: 40 TABLET ORAL at 06:28

## 2018-03-25 RX ADMIN — FUROSEMIDE SCH MG: 40 TABLET ORAL at 19:17

## 2018-03-25 RX ADMIN — ALLOPURINOL SCH MG: 100 TABLET ORAL at 10:25

## 2018-03-25 RX ADMIN — OXYCODONE HYDROCHLORIDE AND ACETAMINOPHEN SCH MG: 500 TABLET ORAL at 21:24

## 2018-03-25 RX ADMIN — DILTIAZEM HYDROCHLORIDE SCH MG: 180 CAPSULE, COATED, EXTENDED RELEASE ORAL at 15:04

## 2018-03-25 RX ADMIN — PRAVASTATIN SODIUM SCH MG: 80 TABLET ORAL at 21:24

## 2018-03-25 RX ADMIN — LISINOPRIL SCH MG: 40 TABLET ORAL at 10:23

## 2018-03-25 RX ADMIN — Medication SCH ML: at 10:19

## 2018-03-25 RX ADMIN — DILTIAZEM HYDROCHLORIDE SCH MG: 180 CAPSULE, COATED, EXTENDED RELEASE ORAL at 02:46

## 2018-03-25 RX ADMIN — MULTIPLE VITAMINS W/ MINERALS TAB SCH EACH: TAB at 10:20

## 2018-03-25 RX ADMIN — OXYCODONE HYDROCHLORIDE AND ACETAMINOPHEN SCH MG: 500 TABLET ORAL at 10:22

## 2018-03-25 RX ADMIN — HEPARIN SODIUM SCH UNIT: 5000 INJECTION, SOLUTION INTRAVENOUS; SUBCUTANEOUS at 06:28

## 2018-03-25 RX ADMIN — FUROSEMIDE SCH MG: 40 TABLET ORAL at 19:16

## 2018-03-25 RX ADMIN — DOCUSATE SODIUM SCH MG: 100 CAPSULE, LIQUID FILLED ORAL at 21:24

## 2018-03-25 RX ADMIN — POTASSIUM CHLORIDE SCH MEQ: 1500 TABLET, EXTENDED RELEASE ORAL at 10:16

## 2018-03-25 RX ADMIN — OMEGA-3 FATTY ACIDS CAP 1000 MG SCH MG: 1000 CAP at 10:17

## 2018-03-25 RX ADMIN — HEPARIN SODIUM SCH UNIT: 5000 INJECTION, SOLUTION INTRAVENOUS; SUBCUTANEOUS at 21:24

## 2018-03-25 RX ADMIN — Medication SCH EACH: at 10:18

## 2018-03-25 RX ADMIN — INSULIN GLARGINE SCH UNITS: 100 INJECTION, SOLUTION SUBCUTANEOUS at 10:29

## 2018-03-25 RX ADMIN — DOCUSATE SODIUM SCH MG: 100 CAPSULE, LIQUID FILLED ORAL at 10:16

## 2018-03-25 RX ADMIN — Medication SCH MCG: at 10:22

## 2018-03-25 RX ADMIN — HEPARIN SODIUM SCH UNIT: 5000 INJECTION, SOLUTION INTRAVENOUS; SUBCUTANEOUS at 15:00

## 2018-03-25 NOTE — PROGRESS NOTE
Assessment and Plan


atient alert, awake. Weak. Resting on nasal canula. O2 saturation 94% on 3 

litres O2.Complaining slight cough. No acute respiratory distress.Says not 

sleeping well with BIPAP Recommend to switch to CPAP 16 cm H2O pressure as he 

is using at home.





- Patient Problems


(1) Metabolic acidosis


Current Visit: Yes   Status: Acute   


Plan to address problem: 


Obtaining ABGs


 ABGs still pending.








(2) Acute on chronic renal failure


Current Visit: Yes   Status: Acute   


Qualifiers: 


   Acute renal failure type: with acute tubular necrosis 


Plan to address problem: 


Management as per nephrology.








(3) Atrial fibrillation


Current Visit: Yes   Status: Acute   


Qualifiers: 


   Atrial fibrillation type: chronic   Qualified Code(s): I48.2 - Chronic 

atrial fibrillation   


Plan to address problem: 


Management as per primary and cardiology.








(4) Diabetes


Current Visit: Yes   Status: Acute   


Plan to address problem: 


Management as per primary care.








(5) Morbid (severe) obesity due to excess calories


Current Visit: Yes   Status: Acute   


Plan to address problem: 


Weight reduction diet.








(6) Tobacco use


Current Visit: Yes   Status: Acute   


Plan to address problem: 


History of tobacco use 40 years. Stopped smoking 8 years ago.


Recommend PFTs as out patient.








(7) Sleep apnea with use of continuous positive airway pressure (CPAP)


Current Visit: Yes   Status: Acute   


Plan to address problem: 


Recommend to switch to CPAP 16 cm H20 as he is using at home.








Subjective


Date of service: 03/25/18


Principal diagnosis: Severe Sepsis with Shock; Hydronephrosis with Obstructive 

Uropathy; NATALIE


Interval history: 





Patient alert, awake. Weak. Resting on nasal canula. O2 saturation 94% on 3 

litres O2.Complaining slight cough. No acute respiratory distress.Says not 

sleeping well with BIPAP Recommend to switch to CPAP 16 cm H2O pressure as he 

is using at home.





Objective


 Vital Signs - 12hr











  03/25/18 03/25/18 03/25/18





  00:25 01:20 02:46


 


Temperature 98.6 F  


 


Pulse Rate 103 H 103 H 103 H


 


Respiratory 20  





Rate   


 


Blood Pressure   127/75


 


Blood Pressure 127/75  





[Right]   


 


O2 Sat by Pulse 98  





Oximetry   














  03/25/18 03/25/18





  04:58 10:23


 


Temperature 97.6 F 


 


Pulse Rate 110 H 


 


Respiratory 20 





Rate  


 


Blood Pressure  122/70


 


Blood Pressure 121/69 





[Right]  


 


O2 Sat by Pulse 96 





Oximetry  











Constitutional: no acute distress, other (obese)


Eyes: non-icteric


ENT: oropharynx moist, other (large neck circumference)


Neck: supple, no lymphadenopathy, no JVD, other (no thyromegally; mallampatti 4)


Effort: normal


Ascultation: Bilateral: diminished breath sounds, rhonchi (inspiratory in bases 

L>R)


Percussion: Bilateral: not dull


Cardiovascular: irregular rhythm, other (S1,S2, no murmurs, gallops or rubs)


Gastrointestinal: normoactive bowel sounds, soft, non-tender, non-distended, 

other (No hepatoslenomegaly)


Integumentary: rash (stasis dermatitis to legs)


Extremities: no cyanosis, no edema, pulses normal, other (Chronic venous stasis 

dermatitis)


Neurologic: normal mental status, non-focal exam, pupils equal and round, CN II-

XII normal, motor strength normal and


Psychiatric: mood appropriate, affect normal


CBC and BMP: 


 03/25/18 06:45





 03/25/18 06:45


ABG, PT/INR, D-dimer: 


PT/INR, D-dimer











PT  19.9 Sec. (12.2-14.9)  H  03/21/18  16:29    


 


INR  1.59  (0.87-1.13)  H  03/21/18  16:29    








Abnormal lab findings: 


 Abnormal Labs











  03/21/18 03/21/18 03/21/18





  11:27 11:27 11:27


 


WBC  12.1 H  


 


RBC  5.37 H  


 


Hgb  16.3 H  


 


Hct  48.7 H  


 


RDW  15.7 H  


 


Plt Count  136 L  


 


Lymph % (Auto)  6.5 L  


 


Mono % (Auto)  14.7 H  


 


Lymph #  0.8 L  


 


Mono #  1.8 H  


 


Seg Neutrophils %  78.3 H  


 


Lymphocytes % (Manual)   


 


Monocytes % (Manual)   


 


Eosinophils % (Manual)   


 


Seg Neutrophils #  9.5 H  


 


Seg Neutrophils # Man   


 


Lymphocytes # (Manual)   


 


Monocytes # (Manual)   


 


Eosinophils # (Manual)   


 


PT   20.5 H 


 


INR   1.65 H 


 


APTT   40.2 H 


 


VBG pH   


 


Sodium    133 L


 


Chloride    93.9 L


 


Carbon Dioxide    20 L


 


BUN    79 H


 


Creatinine    3.8 H


 


Glucose    153 H


 


POC Glucose   


 


Lactic Acid   


 


Calcium   


 


AST    75 H


 


ALT    58 H


 


Alkaline Phosphatase    142 H


 


C-Reactive Protein   


 


Albumin    2.8 L


 


Urine WBC (Auto)   














  03/21/18 03/21/18 03/21/18





  11:27 11:27 11:27


 


WBC   


 


RBC   


 


Hgb   


 


Hct   


 


RDW   


 


Plt Count   


 


Lymph % (Auto)   


 


Mono % (Auto)   


 


Lymph #   


 


Mono #   


 


Seg Neutrophils %   


 


Lymphocytes % (Manual)   


 


Monocytes % (Manual)   


 


Eosinophils % (Manual)   


 


Seg Neutrophils #   


 


Seg Neutrophils # Man   


 


Lymphocytes # (Manual)   


 


Monocytes # (Manual)   


 


Eosinophils # (Manual)   


 


PT   


 


INR   


 


APTT   


 


VBG pH   7.316 L 


 


Sodium   


 


Chloride   


 


Carbon Dioxide   


 


BUN   


 


Creatinine   


 


Glucose   


 


POC Glucose   


 


Lactic Acid  2.30 H*  


 


Calcium   


 


AST   


 


ALT   


 


Alkaline Phosphatase   


 


C-Reactive Protein    12.40 H


 


Albumin   


 


Urine WBC (Auto)   














  03/21/18 03/21/18 03/21/18





  13:49 16:29 16:29


 


WBC    11.7 H


 


RBC   


 


Hgb   


 


Hct   


 


RDW    15.9 H


 


Plt Count    121 L


 


Lymph % (Auto)   


 


Mono % (Auto)   


 


Lymph #   


 


Mono #   


 


Seg Neutrophils %   


 


Lymphocytes % (Manual)    5.0 L


 


Monocytes % (Manual)    19.0 H


 


Eosinophils % (Manual)   


 


Seg Neutrophils #   


 


Seg Neutrophils # Man    8.2 H


 


Lymphocytes # (Manual)    0.6 L


 


Monocytes # (Manual)    2.2 H


 


Eosinophils # (Manual)   


 


PT   


 


INR   


 


APTT   


 


VBG pH   


 


Sodium   


 


Chloride   


 


Carbon Dioxide   


 


BUN   


 


Creatinine   


 


Glucose   


 


POC Glucose   


 


Lactic Acid   2.20 H* 


 


Calcium   


 


AST   


 


ALT   


 


Alkaline Phosphatase   


 


C-Reactive Protein   


 


Albumin   


 


Urine WBC (Auto)  37.0 H  














  03/21/18 03/21/18 03/21/18





  16:29 17:22 20:49


 


WBC   


 


RBC   


 


Hgb   


 


Hct   


 


RDW   


 


Plt Count   


 


Lymph % (Auto)   


 


Mono % (Auto)   


 


Lymph #   


 


Mono #   


 


Seg Neutrophils %   


 


Lymphocytes % (Manual)   


 


Monocytes % (Manual)   


 


Eosinophils % (Manual)   


 


Seg Neutrophils #   


 


Seg Neutrophils # Man   


 


Lymphocytes # (Manual)   


 


Monocytes # (Manual)   


 


Eosinophils # (Manual)   


 


PT  19.9 H  


 


INR  1.59 H  


 


APTT  43.9 H  


 


VBG pH   


 


Sodium   


 


Chloride   


 


Carbon Dioxide   18 L 


 


BUN   70 H 


 


Creatinine   3.3 H 


 


Glucose   


 


POC Glucose   


 


Lactic Acid    2.10 H*


 


Calcium   8.1 L 


 


AST   


 


ALT   


 


Alkaline Phosphatase   


 


C-Reactive Protein   


 


Albumin   


 


Urine WBC (Auto)   














  03/21/18 03/22/18 03/22/18





  20:49 11:40 17:25


 


WBC   


 


RBC   


 


Hgb   


 


Hct   


 


RDW   


 


Plt Count   


 


Lymph % (Auto)   


 


Mono % (Auto)   


 


Lymph #   


 


Mono #   


 


Seg Neutrophils %   


 


Lymphocytes % (Manual)   


 


Monocytes % (Manual)   


 


Eosinophils % (Manual)   


 


Seg Neutrophils #   


 


Seg Neutrophils # Man   


 


Lymphocytes # (Manual)   


 


Monocytes # (Manual)   


 


Eosinophils # (Manual)   


 


PT   


 


INR   


 


APTT   


 


VBG pH   


 


Sodium  135 L  


 


Chloride   


 


Carbon Dioxide  15 L  


 


BUN  70 H  


 


Creatinine  3.1 H  


 


Glucose  108 H  


 


POC Glucose   169 H  313 H


 


Lactic Acid   


 


Calcium   


 


AST   


 


ALT   


 


Alkaline Phosphatase   


 


C-Reactive Protein   


 


Albumin   


 


Urine WBC (Auto)   














  03/22/18 03/23/18 03/23/18





  17:41 00:11 03:58


 


WBC   


 


RBC    5.10 H


 


Hgb   


 


Hct    46.9 H


 


RDW    15.9 H


 


Plt Count    135 L


 


Lymph % (Auto)   


 


Mono % (Auto)   


 


Lymph #   


 


Mono #   


 


Seg Neutrophils %   


 


Lymphocytes % (Manual)    9.0 L


 


Monocytes % (Manual)    14.0 H


 


Eosinophils % (Manual)   


 


Seg Neutrophils #   


 


Seg Neutrophils # Man   


 


Lymphocytes # (Manual)    0.9 L


 


Monocytes # (Manual)    1.3 H


 


Eosinophils # (Manual)   


 


PT   


 


INR   


 


APTT   


 


VBG pH   


 


Sodium   


 


Chloride   


 


Carbon Dioxide  20 L  


 


BUN  61 H  


 


Creatinine  2.5 H  


 


Glucose  315 H  


 


POC Glucose   277 H 


 


Lactic Acid   


 


Calcium   


 


AST   


 


ALT   


 


Alkaline Phosphatase   


 


C-Reactive Protein   


 


Albumin   


 


Urine WBC (Auto)   














  03/23/18 03/23/18 03/23/18





  03:58 05:02 11:35


 


WBC   


 


RBC   


 


Hgb   


 


Hct   


 


RDW   


 


Plt Count   


 


Lymph % (Auto)   


 


Mono % (Auto)   


 


Lymph #   


 


Mono #   


 


Seg Neutrophils %   


 


Lymphocytes % (Manual)   


 


Monocytes % (Manual)   


 


Eosinophils % (Manual)   


 


Seg Neutrophils #   


 


Seg Neutrophils # Man   


 


Lymphocytes # (Manual)   


 


Monocytes # (Manual)   


 


Eosinophils # (Manual)   


 


PT   


 


INR   


 


APTT   


 


VBG pH   


 


Sodium   


 


Chloride   


 


Carbon Dioxide  20 L  


 


BUN  54 H  


 


Creatinine  1.9 H  


 


Glucose  243 H  


 


POC Glucose   243 H  288 H


 


Lactic Acid   


 


Calcium   


 


AST   


 


ALT   


 


Alkaline Phosphatase   


 


C-Reactive Protein   


 


Albumin   


 


Urine WBC (Auto)   














  03/23/18 03/24/18 03/24/18





  17:18 04:54 04:54


 


WBC   


 


RBC   5.46 H 


 


Hgb   16.4 H 


 


Hct   49.6 H 


 


RDW   16.1 H 


 


Plt Count   


 


Lymph % (Auto)   


 


Mono % (Auto)   


 


Lymph #   


 


Mono #   


 


Seg Neutrophils %   


 


Lymphocytes % (Manual)   13.0 L 


 


Monocytes % (Manual)   14.0 H 


 


Eosinophils % (Manual)   5.0 H 


 


Seg Neutrophils #   


 


Seg Neutrophils # Man   


 


Lymphocytes # (Manual)   


 


Monocytes # (Manual)   1.3 H 


 


Eosinophils # (Manual)   0.5 H 


 


PT   


 


INR   


 


APTT   


 


VBG pH   


 


Sodium    146 H


 


Chloride    107.5 H


 


Carbon Dioxide   


 


BUN    46 H


 


Creatinine    1.6 H


 


Glucose    240 H


 


POC Glucose  275 H  


 


Lactic Acid   


 


Calcium   


 


AST   


 


ALT   


 


Alkaline Phosphatase   


 


C-Reactive Protein   


 


Albumin   


 


Urine WBC (Auto)   














  03/24/18 03/24/18 03/25/18





  05:32 23:57 04:31


 


WBC   


 


RBC   


 


Hgb   


 


Hct   


 


RDW   


 


Plt Count   


 


Lymph % (Auto)   


 


Mono % (Auto)   


 


Lymph #   


 


Mono #   


 


Seg Neutrophils %   


 


Lymphocytes % (Manual)   


 


Monocytes % (Manual)   


 


Eosinophils % (Manual)   


 


Seg Neutrophils #   


 


Seg Neutrophils # Man   


 


Lymphocytes # (Manual)   


 


Monocytes # (Manual)   


 


Eosinophils # (Manual)   


 


PT   


 


INR   


 


APTT   


 


VBG pH   


 


Sodium   


 


Chloride   


 


Carbon Dioxide   


 


BUN   


 


Creatinine   


 


Glucose   


 


POC Glucose  223 H  143 H  193 H


 


Lactic Acid   


 


Calcium   


 


AST   


 


ALT   


 


Alkaline Phosphatase   


 


C-Reactive Protein   


 


Albumin   


 


Urine WBC (Auto)   














  03/25/18 03/25/18





  06:45 06:45


 


WBC  


 


RBC  5.21 H 


 


Hgb  15.9 H 


 


Hct  47.5 H 


 


RDW  16.4 H 


 


Plt Count  


 


Lymph % (Auto)  


 


Mono % (Auto)  11.7 H 


 


Lymph #  


 


Mono #  1.2 H 


 


Seg Neutrophils %  71.2 H 


 


Lymphocytes % (Manual)  


 


Monocytes % (Manual)  


 


Eosinophils % (Manual)  


 


Seg Neutrophils #  


 


Seg Neutrophils # Man  


 


Lymphocytes # (Manual)  


 


Monocytes # (Manual)  


 


Eosinophils # (Manual)  


 


PT  


 


INR  


 


APTT  


 


VBG pH  


 


Sodium  


 


Chloride  


 


Carbon Dioxide  


 


BUN   43 H


 


Creatinine  


 


Glucose   233 H


 


POC Glucose  


 


Lactic Acid  


 


Calcium  


 


AST  


 


ALT  


 


Alkaline Phosphatase  


 


C-Reactive Protein  


 


Albumin  


 


Urine WBC (Auto)  











Allied health notes reviewed: nursing

## 2018-03-25 NOTE — PROGRESS NOTE
Assessment and Plan


Impression:


* Acute kidney injury secondary to multifactorial etiologies:  sepsis related 

ATN vs obstructive uropathy


* Right ureteral stone w/ obstruction, right hydronephrosis; s/p cystoscopy, 

urethrotomy and stent.


* Sepsis


* Atrial fibrillation w/ RVR


* Chronic anticoagulation


* Metabolic acidosis





Plan:


* Renal function improving.  Continue current management


* Abx per primary team


* Rate control per cardiology


* Urology following


* Avoid nephrotoxins


* Dose medications for renal function








Subjective


Date of service: 03/25/18


Principal diagnosis: Severe Sepsis with Shock; Hydronephrosis with Obstructive 

Uropathy; NATALIE


Interval history: 


Patient is comfortable this morning.  Shortness of breath is improved.  He has 

a indwelling Quiñones catheter in place.








Objective





- Vital Signs


Vital signs: 


 Vital Signs - 12hr











  03/25/18 03/25/18 03/25/18





  02:46 04:58 10:23


 


Temperature  97.6 F 


 


Pulse Rate 103 H 110 H 


 


Respiratory  20 





Rate   


 


Blood Pressure 127/75  122/70


 


Blood Pressure  121/69 





[Right]   


 


O2 Sat by Pulse  96 





Oximetry   














  03/25/18





  12:39


 


Temperature 98.6 F


 


Pulse Rate 107 H


 


Respiratory 18





Rate 


 


Blood Pressure 


 


Blood Pressure 127/69





[Right] 


 


O2 Sat by Pulse 94





Oximetry 














- General Appearance


General appearance: well-developed, well-nourished, appears stated age


EENT: PERRL, mucous membranes moist


Neck: no JVD, no thyromegaly, no carotid bruit, supple


Respiratory: Present: Clear to Ascultation


Cardiology: regular, normal heart rate, S1S2, no murmurs


Gastrointestinal: normal, normoactive bowel sounds


Integumentary: other (1+ edema . Left leg wrapped with bandage )





- Lab





 03/25/18 06:45





 03/25/18 06:45


 Most recent lab results











Calcium  8.9 mg/dL (8.4-10.2)   03/25/18  06:45

## 2018-03-25 NOTE — PROGRESS NOTE
Assessment and Plan





- Patient Problems


(1) Atrial fibrillation


Current Visit: Yes   Status: Acute   


Qualifiers: 


   Atrial fibrillation type: chronic   Qualified Code(s): I48.2 - Chronic 

atrial fibrillation   


Plan to address problem: 


Continue medical therapy for rate control and oral anticoagulation depending on 

surgical status.








Subjective


Date of service: 03/25/18


Principal diagnosis: Severe Sepsis with Shock; Hydronephrosis with Obstructive 

Uropathy; NATALIE


Interval history: 





Patient looks and feels better, no cardiac complaints.





Objective


 Vital Signs











  Temp Pulse Resp BP BP Pulse Ox


 


 03/25/18 12:39  98.6 F  107 H  18   127/69  94


 


 03/25/18 10:23     122/70  


 


 03/25/18 04:58  97.6 F  110 H  20   121/69  96


 


 03/25/18 02:46   103 H   127/75  


 


 03/25/18 01:20   103 H    


 


 03/25/18 00:25  98.6 F  103 H  20   127/75  98


 


 03/24/18 23:35   109 H  22    98


 


 03/24/18 22:00       94


 


 03/24/18 19:54  98.6 F  103 H  18   127/72  97


 


 03/24/18 17:20  98.2 F  108 H  20   114/67  97














- Physical Examination


General: No Apparent Distress


HEENT: Positive: PERRL


Neck: Positive: neck supple


Cardiac: Positive: Reg Rate and Rhythm


Lungs: Positive: Decreased Breath Sounds


Neuro: Positive: Grossly Intact


Abdomen: Positive: Soft


Skin: Positive: Clear


Extremities: Absent: edema





- Labs and Meds


 CBC











  03/25/18 Range/Units





  06:45 


 


WBC  10.1  (4.5-11.0)  K/mm3


 


RBC  5.21 H  (3.65-5.03)  M/mm3


 


Hgb  15.9 H  (11.8-15.2)  gm/dl


 


Hct  47.5 H  (35.5-45.6)  %


 


Plt Count  170  (140-440)  K/mm3


 


Lymph #  1.4  (1.2-5.4)  K/mm3


 


Mono #  1.2 H  (0.0-0.8)  K/mm3


 


Eos #  0.3  (0.0-0.4)  K/mm3


 


Baso #  0.1  (0.0-0.1)  K/mm3








 Comprehensive Metabolic Panel











  03/25/18 Range/Units





  06:45 


 


Sodium  141  (137-145)  mmol/L


 


Potassium  3.8  (3.6-5.0)  mmol/L


 


Chloride  103.7  ()  mmol/L


 


Carbon Dioxide  22  (22-30)  mmol/L


 


BUN  43 H  (9-20)  mg/dL


 


Creatinine  1.5  (0.8-1.5)  mg/dL


 


Glucose  233 H  ()  mg/dL


 


Calcium  8.9  (8.4-10.2)  mg/dL














- Allied health notes


Allied health notes reviewed: nursing

## 2018-03-25 NOTE — PROGRESS NOTE
Assessment and Plan


Assessment and plan: 





Sepsis.  Continue IV antibiotics and IV fluid hydration.  Blood and urine 

cultures are negative.





Hematuria.  Continue to monitor.  H&H stable.





Paroxysmal Atrial fibrillation with RVR.  Cardiology following.  Cartia XT 

resume.  Resume eliquis when okay with cardiology/surgeon.





Acute on chronic renal failure.  Creatinine continues to Improve.  Etiology is 

multifactorial secondary to acute kidney injury from sepsis/ATN/vasomotor 

nephropathy/volume depletion and obstructive uropathy from ureteral stone/

stricture.  Continue IV fluid hydration and follow BMP closely.  Nephrology 

following.





Obstructive uropathy/hydronephrosis.  Etiology secondary to Right ureteral stone

/stricture.  Patient is status post cystoscopic stent placement.





Diabetes mellitus type 2.  Continue Accu-Cheks and sliding scale insulin.





Hypertension.  Resume antihypertensive medications.




















History


Interval history: 





No new issues overnight.





Hospitalist Physical





- Constitutional


Vitals: 


 











Temp Pulse Resp BP Pulse Ox


 


 97.6 F   110 H  20   122/70   96 


 


 03/25/18 04:58  03/25/18 04:58  03/25/18 04:58  03/25/18 10:23  03/25/18 04:58











General appearance: Present: no acute distress





- EENT


Eyes: Present: PERRL, EOM intact


ENT: hearing intact, clear oral mucosa, dentition normal





- Neck


Neck: Present: supple, normal ROM





- Respiratory


Respiratory effort: normal


Respiratory: bilateral: CTA





- Cardiovascular


Rhythm: regular


Heart Sounds: Present: S1 & S2.  Absent: gallop, rub





- Extremities


Extremities: no ischemia, No edema, Full ROM





- Abdominal


General gastrointestinal: soft, non-tender, non-distended, normal bowel sounds





- Integumentary


Integumentary: Present: clear, warm, dry





- Neurologic


Neurologic: CNII-XII intact, moves all extremities





Results





- Labs


CBC & Chem 7: 


 03/25/18 06:45





 03/25/18 06:45


Labs: 


 Laboratory Last Values











WBC  10.1 K/mm3 (4.5-11.0)   03/25/18  06:45    


 


RBC  5.21 M/mm3 (3.65-5.03)  H  03/25/18  06:45    


 


Hgb  15.9 gm/dl (11.8-15.2)  H  03/25/18  06:45    


 


Hct  47.5 % (35.5-45.6)  H  03/25/18  06:45    


 


MCV  91 fl (84-94)   03/25/18  06:45    


 


MCH  31 pg (28-32)   03/25/18  06:45    


 


MCHC  34 % (32-34)   03/25/18  06:45    


 


RDW  16.4 % (13.2-15.2)  H  03/25/18  06:45    


 


Plt Count  170 K/mm3 (140-440)   03/25/18  06:45    


 


Lymph % (Auto)  13.8 % (13.4-35.0)   03/25/18  06:45    


 


Mono % (Auto)  11.7 % (0.0-7.3)  H  03/25/18  06:45    


 


Eos % (Auto)  2.7 % (0.0-4.3)   03/25/18  06:45    


 


Baso % (Auto)  0.6 % (0.0-1.8)   03/25/18  06:45    


 


Lymph #  1.4 K/mm3 (1.2-5.4)   03/25/18  06:45    


 


Mono #  1.2 K/mm3 (0.0-0.8)  H  03/25/18  06:45    


 


Eos #  0.3 K/mm3 (0.0-0.4)   03/25/18  06:45    


 


Baso #  0.1 K/mm3 (0.0-0.1)   03/25/18  06:45    


 


Add Manual Diff  Complete   03/24/18  04:54    


 


Total Counted  100   03/24/18  04:54    


 


Seg Neutrophils %  71.2 % (40.0-70.0)  H  03/25/18  06:45    


 


Seg Neuts % (Manual)  61.0 % (40.0-70.0)   03/24/18  04:54    


 


Band Neutrophils %  7.0 %  03/24/18  04:54    


 


Lymphocytes % (Manual)  13.0 % (13.4-35.0)  L  03/24/18  04:54    


 


Reactive Lymphs % (Man)  0 %  03/24/18  04:54    


 


Monocytes % (Manual)  14.0 % (0.0-7.3)  H  03/24/18  04:54    


 


Eosinophils % (Manual)  5.0 % (0.0-4.3)  H  03/24/18  04:54    


 


Basophils % (Manual)  0 % (0.0-1.8)   03/24/18  04:54    


 


Metamyelocytes %  0 %  03/24/18  04:54    


 


Myelocytes %  0 %  03/24/18  04:54    


 


Promyelocytes %  0 %  03/24/18  04:54    


 


Blast Cells %  0 %  03/24/18  04:54    


 


Nucleated RBC %  Not Reportable   03/24/18  04:54    


 


Seg Neutrophils #  7.2 K/mm3 (1.8-7.7)   03/25/18  06:45    


 


Seg Neutrophils # Man  5.5 K/mm3 (1.8-7.7)   03/24/18  04:54    


 


Band Neutrophils #  0.6 K/mm3  03/24/18  04:54    


 


Lymphocytes # (Manual)  1.2 K/mm3 (1.2-5.4)   03/24/18  04:54    


 


Abs React Lymphs (Man)  0.0 K/mm3  03/24/18  04:54    


 


Monocytes # (Manual)  1.3 K/mm3 (0.0-0.8)  H  03/24/18  04:54    


 


Eosinophils # (Manual)  0.5 K/mm3 (0.0-0.4)  H  03/24/18  04:54    


 


Basophils # (Manual)  0.0 K/mm3 (0.0-0.1)   03/24/18  04:54    


 


Metamyelocytes #  0.0 K/mm3  03/24/18  04:54    


 


Myelocytes #  0.0 K/mm3  03/24/18  04:54    


 


Promyelocytes #  0.0 K/mm3  03/24/18  04:54    


 


Blast Cells #  0.0 K/mm3  03/24/18  04:54    


 


WBC Morphology  Not Reportable   03/24/18  04:54    


 


Hypersegmented Neuts  Not Reportable   03/24/18  04:54    


 


Hyposegmented Neuts  Not Reportable   03/24/18  04:54    


 


Hypogranular Neuts  Not Reportable   03/24/18  04:54    


 


Smudge Cells  Not Reportable   03/24/18  04:54    


 


Toxic Granulation  Not Reportable   03/24/18  04:54    


 


Toxic Vacuolation  Not Reportable   03/24/18  04:54    


 


Dohle Bodies  Not Reportable   03/24/18  04:54    


 


Pelger-Huet Anomaly  Not Reportable   03/24/18  04:54    


 


Jax Rods  Not Reportable   03/24/18  04:54    


 


Platelet Estimate  Appears normal   03/24/18  04:54    


 


Clumped Platelets  Not Reportable   03/24/18  04:54    


 


Plt Clumps, EDTA  Not Reportable   03/24/18  04:54    


 


Large Platelets  Not Reportable   03/24/18  04:54    


 


Giant Platelets  Not Reportable   03/24/18  04:54    


 


Platelet Satelliting  Not Reportable   03/24/18  04:54    


 


Plt Morphology Comment  Not Reportable   03/24/18  04:54    


 


RBC Morphology  Not Reportable   03/24/18  04:54    


 


Dimorphic RBCs  Not Reportable   03/24/18  04:54    


 


Polychromasia  Not Reportable   03/24/18  04:54    


 


Hypochromasia  Not Reportable   03/24/18  04:54    


 


Poikilocytosis  Not Reportable   03/24/18  04:54    


 


Anisocytosis  1+   03/24/18  04:54    


 


Microcytosis  Not Reportable   03/24/18  04:54    


 


Macrocytosis  Not Reportable   03/24/18  04:54    


 


Spherocytes  Not Reportable   03/24/18  04:54    


 


Pappenheimer Bodies  Not Reportable   03/24/18  04:54    


 


Sickle Cells  Not Reportable   03/24/18  04:54    


 


Target Cells  Not Reportable   03/24/18  04:54    


 


Tear Drop Cells  Not Reportable   03/24/18  04:54    


 


Ovalocytes  Not Reportable   03/24/18  04:54    


 


Helmet Cells  Not Reportable   03/24/18  04:54    


 


Torres-[place] Bodies  Not Reportable   03/24/18  04:54    


 


Cabot Rings  Not Reportable   03/24/18  04:54    


 


Gopal Cells  Not Reportable   03/24/18  04:54    


 


Bite Cells  Not Reportable   03/24/18  04:54    


 


Crenated Cell  Not Reportable   03/24/18  04:54    


 


Elliptocytes  Not Reportable   03/24/18  04:54    


 


Acanthocytes (Spur)  Not Reportable   03/24/18  04:54    


 


Rouleaux  Not Reportable   03/24/18  04:54    


 


Hemoglobin C Crystals  Not Reportable   03/24/18  04:54    


 


Schistocytes  Not Reportable   03/24/18  04:54    


 


Malaria parasites  Not Reportable   03/24/18  04:54    


 


Beau Bodies  Not Reportable   03/24/18  04:54    


 


Hem Pathologist Commnt  No   03/24/18  04:54    


 


PT  19.9 Sec. (12.2-14.9)  H  03/21/18  16:29    


 


INR  1.59  (0.87-1.13)  H  03/21/18  16:29    


 


APTT  43.9 Sec. (24.2-36.6)  H  03/21/18  16:29    


 


VBG pH  7.316  (7.320-7.420)  L  03/21/18  11:27    


 


Sodium  141 mmol/L (137-145)   03/25/18  06:45    


 


Potassium  3.8 mmol/L (3.6-5.0)   03/25/18  06:45    


 


Chloride  103.7 mmol/L ()   03/25/18  06:45    


 


Carbon Dioxide  22 mmol/L (22-30)   03/25/18  06:45    


 


Anion Gap  19 mmol/L  03/25/18  06:45    


 


BUN  43 mg/dL (9-20)  H  03/25/18  06:45    


 


Creatinine  1.5 mg/dL (0.8-1.5)   03/25/18  06:45    


 


Estimated GFR  46 ml/min  03/25/18  06:45    


 


BUN/Creatinine Ratio  29 %  03/25/18  06:45    


 


Glucose  233 mg/dL ()  H  03/25/18  06:45    


 


POC Glucose  193  ()  H  03/25/18  04:31    


 


Lactic Acid  2.10 mmol/L (0.7-2.0)  H*  03/21/18  20:49    


 


Calcium  8.9 mg/dL (8.4-10.2)   03/25/18  06:45    


 


Total Bilirubin  0.90 mg/dL (0.1-1.2)   03/21/18  11:27    


 


AST  75 units/L (5-40)  H  03/21/18  11:27    


 


ALT  58 units/L (7-56)  H  03/21/18  11:27    


 


Alkaline Phosphatase  142 units/L ()  H  03/21/18  11:27    


 


C-Reactive Protein  12.40 mg/dL (0.00-1.30)  H  03/21/18  11:27    


 


Total Protein  6.5 g/dL (6.3-8.2)   03/21/18  11:27    


 


Albumin  2.8 g/dL (3.9-5)  L  03/21/18  11:27    


 


Albumin/Globulin Ratio  0.8 %  03/21/18  11:27    


 


Urine Color  Zamzam  (Yellow)   03/21/18  13:49    


 


Urine Turbidity  Clear  (Clear)   03/21/18  13:49    


 


Urine pH  5.0  (5.0-7.0)   03/21/18  13:49    


 


Ur Specific Gravity  1.010  (1.003-1.030)   03/21/18  13:49    


 


Urine Protein  30 mg/dl mg/dL (Negative)   03/21/18  13:49    


 


Urine Glucose (UA)  >=500 mg/dL (Negative)   03/21/18  13:49    


 


Urine Ketones  Neg mg/dL (Negative)   03/21/18  13:49    


 


Urine Blood  Lg  (Negative)   03/21/18  13:49    


 


Urine Nitrite  Neg  (Negative)   03/21/18  13:49    


 


Urine Bilirubin  Neg  (Negative)   03/21/18  13:49    


 


Urine Urobilinogen  < 2.0 mg/dL (<2.0)   03/21/18  13:49    


 


Ur Leukocyte Esterase  Tr  (Negative)   03/21/18  13:49    


 


Urine WBC (Auto)  37.0 /HPF (0.0-6.0)  H  03/21/18  13:49    


 


Urine RBC (Auto)  > 182.0 /HPF (0.0-6.0)   03/21/18  13:49    


 


U Epithel Cells (Auto)  1.0 /HPF (0-13.0)   03/21/18  13:49    


 


Urine Bacteria (Auto)  1+ /HPF (Negative)   03/21/18  13:49    


 


Urine Mucus  Few /HPF  03/21/18  13:49

## 2018-03-26 LAB
BASOPHILS # (AUTO): 0 K/MM3 (ref 0–0.1)
BASOPHILS NFR BLD AUTO: 0.4 % (ref 0–1.8)
BUN SERPL-MCNC: 41 MG/DL (ref 9–20)
BUN/CREAT SERPL: 29 %
CALCIUM SERPL-MCNC: 8.5 MG/DL (ref 8.4–10.2)
EOSINOPHIL # BLD AUTO: 0.3 K/MM3 (ref 0–0.4)
EOSINOPHIL NFR BLD AUTO: 3.4 % (ref 0–4.3)
HCT VFR BLD CALC: 44.9 % (ref 35.5–45.6)
HEMOLYSIS INDEX: 3
HGB BLD-MCNC: 15.3 GM/DL (ref 11.8–15.2)
LYMPHOCYTES # BLD AUTO: 1.5 K/MM3 (ref 1.2–5.4)
LYMPHOCYTES NFR BLD AUTO: 16.3 % (ref 13.4–35)
MCH RBC QN AUTO: 31 PG (ref 28–32)
MCHC RBC AUTO-ENTMCNC: 34 % (ref 32–34)
MCV RBC AUTO: 90 FL (ref 84–94)
MONOCYTES # (AUTO): 1 K/MM3 (ref 0–0.8)
MONOCYTES % (AUTO): 11.5 % (ref 0–7.3)
PLATELET # BLD: 176 K/MM3 (ref 140–440)
RBC # BLD AUTO: 4.99 M/MM3 (ref 3.65–5.03)

## 2018-03-26 PROCEDURE — 4A033R1 MEASUREMENT OF ARTERIAL SATURATION, PERIPHERAL, PERCUTANEOUS APPROACH: ICD-10-PCS | Performed by: INTERNAL MEDICINE

## 2018-03-26 RX ADMIN — DILTIAZEM HYDROCHLORIDE SCH MG: 180 CAPSULE, COATED, EXTENDED RELEASE ORAL at 02:37

## 2018-03-26 RX ADMIN — HEPARIN SODIUM SCH UNIT: 5000 INJECTION, SOLUTION INTRAVENOUS; SUBCUTANEOUS at 22:00

## 2018-03-26 RX ADMIN — DOCUSATE SODIUM SCH MG: 100 CAPSULE, LIQUID FILLED ORAL at 21:59

## 2018-03-26 RX ADMIN — Medication SCH ML: at 13:08

## 2018-03-26 RX ADMIN — DILTIAZEM HYDROCHLORIDE SCH MG: 180 CAPSULE, COATED, EXTENDED RELEASE ORAL at 19:48

## 2018-03-26 RX ADMIN — HEPARIN SODIUM SCH: 5000 INJECTION, SOLUTION INTRAVENOUS; SUBCUTANEOUS at 13:00

## 2018-03-26 RX ADMIN — Medication SCH ML: at 22:07

## 2018-03-26 RX ADMIN — ALLOPURINOL SCH MG: 100 TABLET ORAL at 13:06

## 2018-03-26 RX ADMIN — FUROSEMIDE SCH MG: 40 TABLET ORAL at 05:42

## 2018-03-26 RX ADMIN — FUROSEMIDE SCH MG: 40 TABLET ORAL at 19:00

## 2018-03-26 RX ADMIN — LISINOPRIL SCH MG: 40 TABLET ORAL at 13:03

## 2018-03-26 RX ADMIN — OMEGA-3 FATTY ACIDS CAP 1000 MG SCH MG: 1000 CAP at 13:04

## 2018-03-26 RX ADMIN — PRAVASTATIN SODIUM SCH MG: 80 TABLET ORAL at 21:59

## 2018-03-26 RX ADMIN — DOCUSATE SODIUM SCH MG: 100 CAPSULE, LIQUID FILLED ORAL at 13:05

## 2018-03-26 RX ADMIN — OXYCODONE HYDROCHLORIDE AND ACETAMINOPHEN SCH MG: 500 TABLET ORAL at 13:07

## 2018-03-26 RX ADMIN — MULTIPLE VITAMINS W/ MINERALS TAB SCH EACH: TAB at 13:07

## 2018-03-26 RX ADMIN — EZETIMIBE SCH MG: 10 TABLET ORAL at 22:00

## 2018-03-26 RX ADMIN — HEPARIN SODIUM SCH UNIT: 5000 INJECTION, SOLUTION INTRAVENOUS; SUBCUTANEOUS at 05:42

## 2018-03-26 RX ADMIN — Medication SCH MCG: at 13:03

## 2018-03-26 RX ADMIN — POTASSIUM CHLORIDE SCH MEQ: 1500 TABLET, EXTENDED RELEASE ORAL at 13:04

## 2018-03-26 RX ADMIN — INSULIN GLARGINE SCH UNITS: 100 INJECTION, SOLUTION SUBCUTANEOUS at 10:30

## 2018-03-26 RX ADMIN — OXYCODONE HYDROCHLORIDE AND ACETAMINOPHEN SCH MG: 500 TABLET ORAL at 21:59

## 2018-03-26 RX ADMIN — Medication SCH EACH: at 13:05

## 2018-03-26 NOTE — PROGRESS NOTE
Assessment and Plan


Assessment and plan: 





Sepsis.  Continue IV antibiotics and IV fluid hydration.  Blood and urine 

cultures are negative.





Hematuria.  Continue to monitor.  H&H stable.





Paroxysmal Atrial fibrillation with RVR.  Cardiology following.  Cartia XT 

resume.  Resume eliquis when okay with cardiology/surgeon.





Acute on chronic renal failure.  Creatinine continues to Improve.  Etiology is 

multifactorial secondary to acute kidney injury from sepsis/ATN/vasomotor 

nephropathy/volume depletion and obstructive uropathy from ureteral stone/

stricture.  Continue IV fluid hydration and follow BMP closely.  Nephrology 

following.





Obstructive uropathy/hydronephrosis.  Etiology secondary to Right ureteral stone

/stricture.  Patient is status post cystoscopic stent placement.





Diabetes mellitus type 2.  Continue Accu-Cheks and sliding scale insulin.





Hypertension.  Resume antihypertensive medications.




















History


Interval history: 





No new issues overnight.





Hospitalist Physical





- Constitutional


Vitals: 


 











Temp Pulse Resp BP Pulse Ox


 


 97.6 F   87   20   124/75   94 


 


 03/26/18 08:40  03/26/18 09:04  03/26/18 08:40  03/26/18 08:40  03/26/18 08:40











General appearance: Present: no acute distress





- EENT


Eyes: Present: PERRL, EOM intact


ENT: hearing intact, clear oral mucosa, dentition normal





- Neck


Neck: Present: supple, normal ROM





- Respiratory


Respiratory effort: normal


Respiratory: bilateral: CTA





- Cardiovascular


Rhythm: regular


Heart Sounds: Present: S1 & S2.  Absent: gallop, rub





- Extremities


Extremities: no ischemia, No edema, Full ROM





- Abdominal


General gastrointestinal: soft, non-tender, non-distended, normal bowel sounds





- Integumentary


Integumentary: Present: clear, warm, dry





- Neurologic


Neurologic: CNII-XII intact, moves all extremities





Results





- Labs


CBC & Chem 7: 


 03/26/18 04:50





 03/26/18 04:50


Labs: 


 Laboratory Last Values











WBC  9.1 K/mm3 (4.5-11.0)   03/26/18  04:50    


 


RBC  4.99 M/mm3 (3.65-5.03)   03/26/18  04:50    


 


Hgb  15.3 gm/dl (11.8-15.2)  H  03/26/18  04:50    


 


Hct  44.9 % (35.5-45.6)   03/26/18  04:50    


 


MCV  90 fl (84-94)   03/26/18  04:50    


 


MCH  31 pg (28-32)   03/26/18  04:50    


 


MCHC  34 % (32-34)   03/26/18  04:50    


 


RDW  16.0 % (13.2-15.2)  H  03/26/18  04:50    


 


Plt Count  176 K/mm3 (140-440)   03/26/18  04:50    


 


Lymph % (Auto)  16.3 % (13.4-35.0)   03/26/18  04:50    


 


Mono % (Auto)  11.5 % (0.0-7.3)  H  03/26/18  04:50    


 


Eos % (Auto)  3.4 % (0.0-4.3)   03/26/18  04:50    


 


Baso % (Auto)  0.4 % (0.0-1.8)   03/26/18  04:50    


 


Lymph #  1.5 K/mm3 (1.2-5.4)   03/26/18  04:50    


 


Mono #  1.0 K/mm3 (0.0-0.8)  H  03/26/18  04:50    


 


Eos #  0.3 K/mm3 (0.0-0.4)   03/26/18  04:50    


 


Baso #  0.0 K/mm3 (0.0-0.1)   03/26/18  04:50    


 


Add Manual Diff  Complete   03/24/18  04:54    


 


Total Counted  100   03/24/18  04:54    


 


Seg Neutrophils %  68.4 % (40.0-70.0)   03/26/18  04:50    


 


Seg Neuts % (Manual)  61.0 % (40.0-70.0)   03/24/18  04:54    


 


Band Neutrophils %  7.0 %  03/24/18  04:54    


 


Lymphocytes % (Manual)  13.0 % (13.4-35.0)  L  03/24/18  04:54    


 


Reactive Lymphs % (Man)  0 %  03/24/18  04:54    


 


Monocytes % (Manual)  14.0 % (0.0-7.3)  H  03/24/18  04:54    


 


Eosinophils % (Manual)  5.0 % (0.0-4.3)  H  03/24/18  04:54    


 


Basophils % (Manual)  0 % (0.0-1.8)   03/24/18  04:54    


 


Metamyelocytes %  0 %  03/24/18  04:54    


 


Myelocytes %  0 %  03/24/18  04:54    


 


Promyelocytes %  0 %  03/24/18  04:54    


 


Blast Cells %  0 %  03/24/18  04:54    


 


Nucleated RBC %  Not Reportable   03/24/18  04:54    


 


Seg Neutrophils #  6.2 K/mm3 (1.8-7.7)   03/26/18  04:50    


 


Seg Neutrophils # Man  5.5 K/mm3 (1.8-7.7)   03/24/18  04:54    


 


Band Neutrophils #  0.6 K/mm3  03/24/18  04:54    


 


Lymphocytes # (Manual)  1.2 K/mm3 (1.2-5.4)   03/24/18  04:54    


 


Abs React Lymphs (Man)  0.0 K/mm3  03/24/18  04:54    


 


Monocytes # (Manual)  1.3 K/mm3 (0.0-0.8)  H  03/24/18  04:54    


 


Eosinophils # (Manual)  0.5 K/mm3 (0.0-0.4)  H  03/24/18  04:54    


 


Basophils # (Manual)  0.0 K/mm3 (0.0-0.1)   03/24/18  04:54    


 


Metamyelocytes #  0.0 K/mm3  03/24/18  04:54    


 


Myelocytes #  0.0 K/mm3  03/24/18  04:54    


 


Promyelocytes #  0.0 K/mm3  03/24/18  04:54    


 


Blast Cells #  0.0 K/mm3  03/24/18  04:54    


 


WBC Morphology  Not Reportable   03/24/18  04:54    


 


Hypersegmented Neuts  Not Reportable   03/24/18  04:54    


 


Hyposegmented Neuts  Not Reportable   03/24/18  04:54    


 


Hypogranular Neuts  Not Reportable   03/24/18  04:54    


 


Smudge Cells  Not Reportable   03/24/18  04:54    


 


Toxic Granulation  Not Reportable   03/24/18  04:54    


 


Toxic Vacuolation  Not Reportable   03/24/18  04:54    


 


Dohle Bodies  Not Reportable   03/24/18  04:54    


 


Pelger-Huet Anomaly  Not Reportable   03/24/18  04:54    


 


Jax Rods  Not Reportable   03/24/18  04:54    


 


Platelet Estimate  Appears normal   03/24/18  04:54    


 


Clumped Platelets  Not Reportable   03/24/18  04:54    


 


Plt Clumps, EDTA  Not Reportable   03/24/18  04:54    


 


Large Platelets  Not Reportable   03/24/18  04:54    


 


Giant Platelets  Not Reportable   03/24/18  04:54    


 


Platelet Satelliting  Not Reportable   03/24/18  04:54    


 


Plt Morphology Comment  Not Reportable   03/24/18  04:54    


 


RBC Morphology  Not Reportable   03/24/18  04:54    


 


Dimorphic RBCs  Not Reportable   03/24/18  04:54    


 


Polychromasia  Not Reportable   03/24/18  04:54    


 


Hypochromasia  Not Reportable   03/24/18  04:54    


 


Poikilocytosis  Not Reportable   03/24/18  04:54    


 


Anisocytosis  1+   03/24/18  04:54    


 


Microcytosis  Not Reportable   03/24/18  04:54    


 


Macrocytosis  Not Reportable   03/24/18  04:54    


 


Spherocytes  Not Reportable   03/24/18  04:54    


 


Pappenheimer Bodies  Not Reportable   03/24/18  04:54    


 


Sickle Cells  Not Reportable   03/24/18  04:54    


 


Target Cells  Not Reportable   03/24/18  04:54    


 


Tear Drop Cells  Not Reportable   03/24/18  04:54    


 


Ovalocytes  Not Reportable   03/24/18  04:54    


 


Helmet Cells  Not Reportable   03/24/18  04:54    


 


Torres-[place] Bodies  Not Reportable   03/24/18  04:54    


 


Cabot Rings  Not Reportable   03/24/18  04:54    


 


Gopal Cells  Not Reportable   03/24/18  04:54    


 


Bite Cells  Not Reportable   03/24/18  04:54    


 


Crenated Cell  Not Reportable   03/24/18  04:54    


 


Elliptocytes  Not Reportable   03/24/18  04:54    


 


Acanthocytes (Spur)  Not Reportable   03/24/18  04:54    


 


Rouleaux  Not Reportable   03/24/18  04:54    


 


Hemoglobin C Crystals  Not Reportable   03/24/18  04:54    


 


Schistocytes  Not Reportable   03/24/18  04:54    


 


Malaria parasites  Not Reportable   03/24/18  04:54    


 


Beau Bodies  Not Reportable   03/24/18  04:54    


 


Hem Pathologist Commnt  No   03/24/18  04:54    


 


PT  19.9 Sec. (12.2-14.9)  H  03/21/18  16:29    


 


INR  1.59  (0.87-1.13)  H  03/21/18  16:29    


 


APTT  43.9 Sec. (24.2-36.6)  H  03/21/18  16:29    


 


VBG pH  7.316  (7.320-7.420)  L  03/21/18  11:27    


 


Sodium  139 mmol/L (137-145)   03/26/18  04:50    


 


Potassium  3.4 mmol/L (3.6-5.0)  L  03/26/18  04:50    


 


Chloride  100.9 mmol/L ()   03/26/18  04:50    


 


Carbon Dioxide  22 mmol/L (22-30)   03/26/18  04:50    


 


Anion Gap  20 mmol/L  03/26/18  04:50    


 


BUN  41 mg/dL (9-20)  H  03/26/18  04:50    


 


Creatinine  1.4 mg/dL (0.8-1.5)   03/26/18  04:50    


 


Estimated GFR  50 ml/min  03/26/18  04:50    


 


BUN/Creatinine Ratio  29 %  03/26/18  04:50    


 


Glucose  246 mg/dL ()  H  03/26/18  04:50    


 


POC Glucose  204  ()  H  03/26/18  06:41    


 


Lactic Acid  2.10 mmol/L (0.7-2.0)  H*  03/21/18  20:49    


 


Calcium  8.5 mg/dL (8.4-10.2)   03/26/18  04:50    


 


Total Bilirubin  0.90 mg/dL (0.1-1.2)   03/21/18  11:27    


 


AST  75 units/L (5-40)  H  03/21/18  11:27    


 


ALT  58 units/L (7-56)  H  03/21/18  11:27    


 


Alkaline Phosphatase  142 units/L ()  H  03/21/18  11:27    


 


C-Reactive Protein  12.40 mg/dL (0.00-1.30)  H  03/21/18  11:27    


 


Total Protein  6.5 g/dL (6.3-8.2)   03/21/18  11:27    


 


Albumin  2.8 g/dL (3.9-5)  L  03/21/18  11:27    


 


Albumin/Globulin Ratio  0.8 %  03/21/18  11:27    


 


Urine Color  Zamzam  (Yellow)   03/21/18  13:49    


 


Urine Turbidity  Clear  (Clear)   03/21/18  13:49    


 


Urine pH  5.0  (5.0-7.0)   03/21/18  13:49    


 


Ur Specific Gravity  1.010  (1.003-1.030)   03/21/18  13:49    


 


Urine Protein  30 mg/dl mg/dL (Negative)   03/21/18  13:49    


 


Urine Glucose (UA)  >=500 mg/dL (Negative)   03/21/18  13:49    


 


Urine Ketones  Neg mg/dL (Negative)   03/21/18  13:49    


 


Urine Blood  Lg  (Negative)   03/21/18  13:49    


 


Urine Nitrite  Neg  (Negative)   03/21/18  13:49    


 


Urine Bilirubin  Neg  (Negative)   03/21/18  13:49    


 


Urine Urobilinogen  < 2.0 mg/dL (<2.0)   03/21/18  13:49    


 


Ur Leukocyte Esterase  Tr  (Negative)   03/21/18  13:49    


 


Urine WBC (Auto)  37.0 /HPF (0.0-6.0)  H  03/21/18  13:49    


 


Urine RBC (Auto)  > 182.0 /HPF (0.0-6.0)   03/21/18  13:49    


 


U Epithel Cells (Auto)  1.0 /HPF (0-13.0)   03/21/18  13:49    


 


Urine Bacteria (Auto)  1+ /HPF (Negative)   03/21/18  13:49    


 


Urine Mucus  Few /HPF  03/21/18  13:49

## 2018-03-26 NOTE — PROGRESS NOTE
Assessment and Plan








Severe sepsis with shock.


Nephrolithiasis with obstructive uropathy.


Morbid obesity.


Obstructive sleep apnea.


Leukocytosis


Mild hyponatremia.  


Mild metabolic acidosis.  


lactic acidosis.  


Elevated serum transaminases





(Doing much better)





- continue and complete AB's per ID recs


- continue BIPAP qhs / while asleep


- s/p Cysto DVIU with insertion of right ureteral stent


- continue GI & VTE prophylaxis


- PT/OT as tolerated


- gentle hydration 


- Azotemia improving


- definitive stone removal once sepsis resolves


- OK to transfer to medical floor





....35'








Subjective


Date of service: 03/26/18


Principal diagnosis: Severe Sepsis with Shock; Hydronephrosis with Obstructive 

Uropathy; NATALIE


Interval history: 





Patient seen today for: Severe Sepsis with Shock; Hydronephrosis with 

Obstructive Uropathy; NATALIE





Seen and examined at bedside; 24-hour events reviewed; nursing and respiratory 

care staff consulted; no adverse overnight events reported to me; looks and 

feels better; 





Objective


 Vital Signs - 12hr











  03/26/18 03/26/18 03/26/18





  04:31 07:51 08:40


 


Temperature 97.5 F L 97.6 F 97.6 F


 


Pulse Rate 100 H 100 H 99 H


 


Respiratory 20 20 20





Rate   


 


Blood Pressure 106/74 124/75 


 


Blood Pressure   124/75





[Right]   


 


O2 Sat by Pulse 95 94 94





Oximetry   














  03/26/18 03/26/18 03/26/18





  09:04 12:09 12:57


 


Temperature  98.6 F 98.6 F


 


Pulse Rate 87 91 H 60


 


Respiratory  20 20





Rate   


 


Blood Pressure  120/73 


 


Blood Pressure   155/78





[Right]   


 


O2 Sat by Pulse  94 95





Oximetry   











Constitutional: no acute distress, other (obese)


Eyes: non-icteric


ENT: oropharynx moist, other (large neck circumference)


Neck: supple, no lymphadenopathy, no JVD, other (no thyromegally; mallampatti 4)


Effort: normal


Ascultation: Bilateral: clear, diminished breath sounds, rhonchi (inspiratory 

in bases L>R)


Percussion: Bilateral: not dull


Cardiovascular: irregular rhythm, other (S1,S2, no murmurs, gallops or rubs)


Gastrointestinal: normoactive bowel sounds, soft, non-tender, non-distended, 

other (No hepatoslenomegaly)


Integumentary: rash (stasis dermatitis to legs)


Extremities: no cyanosis, no edema, pulses normal, other (Chronic venous stasis 

dermatitis)


Neurologic: normal mental status, non-focal exam, pupils equal and round, CN II-

XII normal, motor strength normal and


Psychiatric: mood appropriate, affect normal


CBC and BMP: 


 03/26/18 04:50





 03/26/18 04:50


ABG, PT/INR, D-dimer: 


ABG











POC ABG pH  7.417  (7.35-7.45)   03/26/18  10:30    


 


POC ABG pCO2  39.9  (35-45)   03/26/18  10:30    


 


POC ABG pO2  66  ()  L  03/26/18  10:30    


 


POC ABG HCO3  25.7   03/26/18  10:30    


 


POC ABG Total CO2  27   03/26/18  10:30    


 


POC ABG O2 Sat  93   03/26/18  10:30    





PT/INR, D-dimer











PT  19.9 Sec. (12.2-14.9)  H  03/21/18  16:29    


 


INR  1.59  (0.87-1.13)  H  03/21/18  16:29    








Abnormal lab findings: 


 Abnormal Labs











  03/21/18 03/21/18 03/21/18





  11:27 11:27 11:27


 


WBC  12.1 H  


 


RBC  5.37 H  


 


Hgb  16.3 H  


 


Hct  48.7 H  


 


RDW  15.7 H  


 


Plt Count  136 L  


 


Lymph % (Auto)  6.5 L  


 


Mono % (Auto)  14.7 H  


 


Lymph #  0.8 L  


 


Mono #  1.8 H  


 


Seg Neutrophils %  78.3 H  


 


Lymphocytes % (Manual)   


 


Monocytes % (Manual)   


 


Eosinophils % (Manual)   


 


Seg Neutrophils #  9.5 H  


 


Seg Neutrophils # Man   


 


Lymphocytes # (Manual)   


 


Monocytes # (Manual)   


 


Eosinophils # (Manual)   


 


PT   20.5 H 


 


INR   1.65 H 


 


APTT   40.2 H 


 


POC ABG pO2   


 


VBG pH   


 


Sodium    133 L


 


Potassium   


 


Chloride    93.9 L


 


Carbon Dioxide    20 L


 


BUN    79 H


 


Creatinine    3.8 H


 


Glucose    153 H


 


POC Glucose   


 


Lactic Acid   


 


Calcium   


 


AST    75 H


 


ALT    58 H


 


Alkaline Phosphatase    142 H


 


C-Reactive Protein   


 


Albumin    2.8 L


 


Urine WBC (Auto)   














  03/21/18 03/21/18 03/21/18





  11:27 11:27 11:27


 


WBC   


 


RBC   


 


Hgb   


 


Hct   


 


RDW   


 


Plt Count   


 


Lymph % (Auto)   


 


Mono % (Auto)   


 


Lymph #   


 


Mono #   


 


Seg Neutrophils %   


 


Lymphocytes % (Manual)   


 


Monocytes % (Manual)   


 


Eosinophils % (Manual)   


 


Seg Neutrophils #   


 


Seg Neutrophils # Man   


 


Lymphocytes # (Manual)   


 


Monocytes # (Manual)   


 


Eosinophils # (Manual)   


 


PT   


 


INR   


 


APTT   


 


POC ABG pO2   


 


VBG pH   7.316 L 


 


Sodium   


 


Potassium   


 


Chloride   


 


Carbon Dioxide   


 


BUN   


 


Creatinine   


 


Glucose   


 


POC Glucose   


 


Lactic Acid  2.30 H*  


 


Calcium   


 


AST   


 


ALT   


 


Alkaline Phosphatase   


 


C-Reactive Protein    12.40 H


 


Albumin   


 


Urine WBC (Auto)   














  03/21/18 03/21/18 03/21/18





  13:49 16:29 16:29


 


WBC    11.7 H


 


RBC   


 


Hgb   


 


Hct   


 


RDW    15.9 H


 


Plt Count    121 L


 


Lymph % (Auto)   


 


Mono % (Auto)   


 


Lymph #   


 


Mono #   


 


Seg Neutrophils %   


 


Lymphocytes % (Manual)    5.0 L


 


Monocytes % (Manual)    19.0 H


 


Eosinophils % (Manual)   


 


Seg Neutrophils #   


 


Seg Neutrophils # Man    8.2 H


 


Lymphocytes # (Manual)    0.6 L


 


Monocytes # (Manual)    2.2 H


 


Eosinophils # (Manual)   


 


PT   


 


INR   


 


APTT   


 


POC ABG pO2   


 


VBG pH   


 


Sodium   


 


Potassium   


 


Chloride   


 


Carbon Dioxide   


 


BUN   


 


Creatinine   


 


Glucose   


 


POC Glucose   


 


Lactic Acid   2.20 H* 


 


Calcium   


 


AST   


 


ALT   


 


Alkaline Phosphatase   


 


C-Reactive Protein   


 


Albumin   


 


Urine WBC (Auto)  37.0 H  














  03/21/18 03/21/18 03/21/18





  16:29 17:22 20:49


 


WBC   


 


RBC   


 


Hgb   


 


Hct   


 


RDW   


 


Plt Count   


 


Lymph % (Auto)   


 


Mono % (Auto)   


 


Lymph #   


 


Mono #   


 


Seg Neutrophils %   


 


Lymphocytes % (Manual)   


 


Monocytes % (Manual)   


 


Eosinophils % (Manual)   


 


Seg Neutrophils #   


 


Seg Neutrophils # Man   


 


Lymphocytes # (Manual)   


 


Monocytes # (Manual)   


 


Eosinophils # (Manual)   


 


PT  19.9 H  


 


INR  1.59 H  


 


APTT  43.9 H  


 


POC ABG pO2   


 


VBG pH   


 


Sodium   


 


Potassium   


 


Chloride   


 


Carbon Dioxide   18 L 


 


BUN   70 H 


 


Creatinine   3.3 H 


 


Glucose   


 


POC Glucose   


 


Lactic Acid    2.10 H*


 


Calcium   8.1 L 


 


AST   


 


ALT   


 


Alkaline Phosphatase   


 


C-Reactive Protein   


 


Albumin   


 


Urine WBC (Auto)   














  03/21/18 03/22/18 03/22/18





  20:49 11:40 17:25


 


WBC   


 


RBC   


 


Hgb   


 


Hct   


 


RDW   


 


Plt Count   


 


Lymph % (Auto)   


 


Mono % (Auto)   


 


Lymph #   


 


Mono #   


 


Seg Neutrophils %   


 


Lymphocytes % (Manual)   


 


Monocytes % (Manual)   


 


Eosinophils % (Manual)   


 


Seg Neutrophils #   


 


Seg Neutrophils # Man   


 


Lymphocytes # (Manual)   


 


Monocytes # (Manual)   


 


Eosinophils # (Manual)   


 


PT   


 


INR   


 


APTT   


 


POC ABG pO2   


 


VBG pH   


 


Sodium  135 L  


 


Potassium   


 


Chloride   


 


Carbon Dioxide  15 L  


 


BUN  70 H  


 


Creatinine  3.1 H  


 


Glucose  108 H  


 


POC Glucose   169 H  313 H


 


Lactic Acid   


 


Calcium   


 


AST   


 


ALT   


 


Alkaline Phosphatase   


 


C-Reactive Protein   


 


Albumin   


 


Urine WBC (Auto)   














  03/22/18 03/23/18 03/23/18





  17:41 00:11 03:58


 


WBC   


 


RBC    5.10 H


 


Hgb   


 


Hct    46.9 H


 


RDW    15.9 H


 


Plt Count    135 L


 


Lymph % (Auto)   


 


Mono % (Auto)   


 


Lymph #   


 


Mono #   


 


Seg Neutrophils %   


 


Lymphocytes % (Manual)    9.0 L


 


Monocytes % (Manual)    14.0 H


 


Eosinophils % (Manual)   


 


Seg Neutrophils #   


 


Seg Neutrophils # Man   


 


Lymphocytes # (Manual)    0.9 L


 


Monocytes # (Manual)    1.3 H


 


Eosinophils # (Manual)   


 


PT   


 


INR   


 


APTT   


 


POC ABG pO2   


 


VBG pH   


 


Sodium   


 


Potassium   


 


Chloride   


 


Carbon Dioxide  20 L  


 


BUN  61 H  


 


Creatinine  2.5 H  


 


Glucose  315 H  


 


POC Glucose   277 H 


 


Lactic Acid   


 


Calcium   


 


AST   


 


ALT   


 


Alkaline Phosphatase   


 


C-Reactive Protein   


 


Albumin   


 


Urine WBC (Auto)   














  03/23/18 03/23/18 03/23/18





  03:58 05:02 11:35


 


WBC   


 


RBC   


 


Hgb   


 


Hct   


 


RDW   


 


Plt Count   


 


Lymph % (Auto)   


 


Mono % (Auto)   


 


Lymph #   


 


Mono #   


 


Seg Neutrophils %   


 


Lymphocytes % (Manual)   


 


Monocytes % (Manual)   


 


Eosinophils % (Manual)   


 


Seg Neutrophils #   


 


Seg Neutrophils # Man   


 


Lymphocytes # (Manual)   


 


Monocytes # (Manual)   


 


Eosinophils # (Manual)   


 


PT   


 


INR   


 


APTT   


 


POC ABG pO2   


 


VBG pH   


 


Sodium   


 


Potassium   


 


Chloride   


 


Carbon Dioxide  20 L  


 


BUN  54 H  


 


Creatinine  1.9 H  


 


Glucose  243 H  


 


POC Glucose   243 H  288 H


 


Lactic Acid   


 


Calcium   


 


AST   


 


ALT   


 


Alkaline Phosphatase   


 


C-Reactive Protein   


 


Albumin   


 


Urine WBC (Auto)   














  03/23/18 03/24/18 03/24/18





  17:18 04:54 04:54


 


WBC   


 


RBC   5.46 H 


 


Hgb   16.4 H 


 


Hct   49.6 H 


 


RDW   16.1 H 


 


Plt Count   


 


Lymph % (Auto)   


 


Mono % (Auto)   


 


Lymph #   


 


Mono #   


 


Seg Neutrophils %   


 


Lymphocytes % (Manual)   13.0 L 


 


Monocytes % (Manual)   14.0 H 


 


Eosinophils % (Manual)   5.0 H 


 


Seg Neutrophils #   


 


Seg Neutrophils # Man   


 


Lymphocytes # (Manual)   


 


Monocytes # (Manual)   1.3 H 


 


Eosinophils # (Manual)   0.5 H 


 


PT   


 


INR   


 


APTT   


 


POC ABG pO2   


 


VBG pH   


 


Sodium    146 H


 


Potassium   


 


Chloride    107.5 H


 


Carbon Dioxide   


 


BUN    46 H


 


Creatinine    1.6 H


 


Glucose    240 H


 


POC Glucose  275 H  


 


Lactic Acid   


 


Calcium   


 


AST   


 


ALT   


 


Alkaline Phosphatase   


 


C-Reactive Protein   


 


Albumin   


 


Urine WBC (Auto)   














  03/24/18 03/24/18 03/24/18





  05:32 11:22 15:48


 


WBC   


 


RBC   


 


Hgb   


 


Hct   


 


RDW   


 


Plt Count   


 


Lymph % (Auto)   


 


Mono % (Auto)   


 


Lymph #   


 


Mono #   


 


Seg Neutrophils %   


 


Lymphocytes % (Manual)   


 


Monocytes % (Manual)   


 


Eosinophils % (Manual)   


 


Seg Neutrophils #   


 


Seg Neutrophils # Man   


 


Lymphocytes # (Manual)   


 


Monocytes # (Manual)   


 


Eosinophils # (Manual)   


 


PT   


 


INR   


 


APTT   


 


POC ABG pO2   


 


VBG pH   


 


Sodium   


 


Potassium   


 


Chloride   


 


Carbon Dioxide   


 


BUN   


 


Creatinine   


 


Glucose   


 


POC Glucose  223 H  310 H  326 H


 


Lactic Acid   


 


Calcium   


 


AST   


 


ALT   


 


Alkaline Phosphatase   


 


C-Reactive Protein   


 


Albumin   


 


Urine WBC (Auto)   














  03/24/18 03/25/18 03/25/18





  23:57 04:31 06:45


 


WBC   


 


RBC    5.21 H


 


Hgb    15.9 H


 


Hct    47.5 H


 


RDW    16.4 H


 


Plt Count   


 


Lymph % (Auto)   


 


Mono % (Auto)    11.7 H


 


Lymph #   


 


Mono #    1.2 H


 


Seg Neutrophils %    71.2 H


 


Lymphocytes % (Manual)   


 


Monocytes % (Manual)   


 


Eosinophils % (Manual)   


 


Seg Neutrophils #   


 


Seg Neutrophils # Man   


 


Lymphocytes # (Manual)   


 


Monocytes # (Manual)   


 


Eosinophils # (Manual)   


 


PT   


 


INR   


 


APTT   


 


POC ABG pO2   


 


VBG pH   


 


Sodium   


 


Potassium   


 


Chloride   


 


Carbon Dioxide   


 


BUN   


 


Creatinine   


 


Glucose   


 


POC Glucose  143 H  193 H 


 


Lactic Acid   


 


Calcium   


 


AST   


 


ALT   


 


Alkaline Phosphatase   


 


C-Reactive Protein   


 


Albumin   


 


Urine WBC (Auto)   














  03/25/18 03/25/18 03/25/18





  06:45 12:12 17:14


 


WBC   


 


RBC   


 


Hgb   


 


Hct   


 


RDW   


 


Plt Count   


 


Lymph % (Auto)   


 


Mono % (Auto)   


 


Lymph #   


 


Mono #   


 


Seg Neutrophils %   


 


Lymphocytes % (Manual)   


 


Monocytes % (Manual)   


 


Eosinophils % (Manual)   


 


Seg Neutrophils #   


 


Seg Neutrophils # Man   


 


Lymphocytes # (Manual)   


 


Monocytes # (Manual)   


 


Eosinophils # (Manual)   


 


PT   


 


INR   


 


APTT   


 


POC ABG pO2   


 


VBG pH   


 


Sodium   


 


Potassium   


 


Chloride   


 


Carbon Dioxide   


 


BUN  43 H  


 


Creatinine   


 


Glucose  233 H  


 


POC Glucose   332 H  263 H


 


Lactic Acid   


 


Calcium   


 


AST   


 


ALT   


 


Alkaline Phosphatase   


 


C-Reactive Protein   


 


Albumin   


 


Urine WBC (Auto)   














  03/25/18 03/26/18 03/26/18





  21:45 00:01 04:50


 


WBC   


 


RBC   


 


Hgb    15.3 H


 


Hct   


 


RDW    16.0 H


 


Plt Count   


 


Lymph % (Auto)   


 


Mono % (Auto)    11.5 H


 


Lymph #   


 


Dent #    1.0 H


 


Seg Neutrophils %   


 


Lymphocytes % (Manual)   


 


Monocytes % (Manual)   


 


Eosinophils % (Manual)   


 


Seg Neutrophils #   


 


Seg Neutrophils # Man   


 


Lymphocytes # (Manual)   


 


Monocytes # (Manual)   


 


Eosinophils # (Manual)   


 


PT   


 


INR   


 


APTT   


 


POC ABG pO2   


 


VBG pH   


 


Sodium   


 


Potassium   


 


Chloride   


 


Carbon Dioxide   


 


BUN   


 


Creatinine   


 


Glucose   


 


POC Glucose  232 H  190 H 


 


Lactic Acid   


 


Calcium   


 


AST   


 


ALT   


 


Alkaline Phosphatase   


 


C-Reactive Protein   


 


Albumin   


 


Urine WBC (Auto)   














  03/26/18 03/26/18 03/26/18





  04:50 06:41 10:30


 


WBC   


 


RBC   


 


Hgb   


 


Hct   


 


RDW   


 


Plt Count   


 


Lymph % (Auto)   


 


Mono % (Auto)   


 


Lymph #   


 


Mono #   


 


Seg Neutrophils %   


 


Lymphocytes % (Manual)   


 


Monocytes % (Manual)   


 


Eosinophils % (Manual)   


 


Seg Neutrophils #   


 


Seg Neutrophils # Man   


 


Lymphocytes # (Manual)   


 


Monocytes # (Manual)   


 


Eosinophils # (Manual)   


 


PT   


 


INR   


 


APTT   


 


POC ABG pO2    66 L


 


VBG pH   


 


Sodium   


 


Potassium  3.4 L  


 


Chloride   


 


Carbon Dioxide   


 


BUN  41 H  


 


Creatinine   


 


Glucose  246 H  


 


POC Glucose   204 H 


 


Lactic Acid   


 


Calcium   


 


AST   


 


ALT   


 


Alkaline Phosphatase   


 


C-Reactive Protein   


 


Albumin   


 


Urine WBC (Auto)   














  03/26/18





  10:36


 


WBC 


 


RBC 


 


Hgb 


 


Hct 


 


RDW 


 


Plt Count 


 


Lymph % (Auto) 


 


Mono % (Auto) 


 


Lymph # 


 


Mono # 


 


Seg Neutrophils % 


 


Lymphocytes % (Manual) 


 


Monocytes % (Manual) 


 


Eosinophils % (Manual) 


 


Seg Neutrophils # 


 


Seg Neutrophils # Man 


 


Lymphocytes # (Manual) 


 


Monocytes # (Manual) 


 


Eosinophils # (Manual) 


 


PT 


 


INR 


 


APTT 


 


POC ABG pO2 


 


VBG pH 


 


Sodium 


 


Potassium 


 


Chloride 


 


Carbon Dioxide 


 


BUN 


 


Creatinine 


 


Glucose 


 


POC Glucose  318 H


 


Lactic Acid 


 


Calcium 


 


AST 


 


ALT 


 


Alkaline Phosphatase 


 


C-Reactive Protein 


 


Albumin 


 


Urine WBC (Auto) 











Allied health notes reviewed: nursing

## 2018-03-26 NOTE — PROGRESS NOTE
Assessment and Plan





- Patient Problems


(1) Atrial fibrillation


Current Visit: Yes   Status: Acute   


Qualifiers: 


   Atrial fibrillation type: chronic   Qualified Code(s): I48.2 - Chronic 

atrial fibrillation   


Plan to address problem: 


Continue medical therapy for rate control and oral anticoagulation depending on 

surgical status.








Subjective


Date of service: 03/26/18


Principal diagnosis: Severe Sepsis with Shock; Hydronephrosis with Obstructive 

Uropathy; NATALIE


Interval history: 





Patient is comfortable, no new cardiac complaints.





Objective


 Vital Signs











  Temp Pulse Resp BP BP Pulse Ox


 


 03/26/18 13:04  98.6 F  95 H  20   120/73  95


 


 03/26/18 13:03   109 H    


 


 03/26/18 12:57  98.6 F  60  20   155/78  95


 


 03/26/18 12:09  98.6 F  91 H  20  120/73   94


 


 03/26/18 10:00       98


 


 03/26/18 09:04   87    


 


 03/26/18 08:40  97.6 F  99 H  20   124/75  94


 


 03/26/18 07:51  97.6 F  100 H  20  124/75   94


 


 03/26/18 04:31  97.5 F L  100 H  20  106/74   95


 


 03/25/18 23:52  98.1 F  90  20  123/78   96


 


 03/25/18 22:00   90     95


 


 03/25/18 20:18  98.2 F  99 H  20  108/70   94


 


 03/25/18 17:05   113 H   139/78   96


 


 03/25/18 16:44       95


 


 03/25/18 15:04   107 H    














- Physical Examination


General: No Apparent Distress


HEENT: Positive: PERRL


Neck: Positive: neck supple


Cardiac: Positive: Irregularly Regular


Lungs: Positive: Decreased Breath Sounds


Neuro: Positive: Grossly Intact


Abdomen: Positive: Soft


Skin: Positive: Clear


Extremities: Absent: edema





- Labs and Meds


 CBC











  03/26/18 Range/Units





  04:50 


 


WBC  9.1  (4.5-11.0)  K/mm3


 


RBC  4.99  (3.65-5.03)  M/mm3


 


Hgb  15.3 H  (11.8-15.2)  gm/dl


 


Hct  44.9  (35.5-45.6)  %


 


Plt Count  176  (140-440)  K/mm3


 


Lymph #  1.5  (1.2-5.4)  K/mm3


 


Mono #  1.0 H  (0.0-0.8)  K/mm3


 


Eos #  0.3  (0.0-0.4)  K/mm3


 


Baso #  0.0  (0.0-0.1)  K/mm3








 Comprehensive Metabolic Panel











  03/26/18 Range/Units





  04:50 


 


Sodium  139  (137-145)  mmol/L


 


Potassium  3.4 L  (3.6-5.0)  mmol/L


 


Chloride  100.9  ()  mmol/L


 


Carbon Dioxide  22  (22-30)  mmol/L


 


BUN  41 H  (9-20)  mg/dL


 


Creatinine  1.4  (0.8-1.5)  mg/dL


 


Glucose  246 H  ()  mg/dL


 


Calcium  8.5  (8.4-10.2)  mg/dL














- Allied health notes


Allied health notes reviewed: nursing

## 2018-03-26 NOTE — PROGRESS NOTE
Assessment and Plan








Impression:


* Acute kidney injury secondary to multifactorial etiologies:  sepsis related 

ATN vs obstructive uropathy


* Right ureteral stone w/ obstruction, right hydronephrosis; s/p cystoscopy, 

urethrotomy and stent.


* Sepsis


* Atrial fibrillation w/ RVR


* Chronic anticoagulation


* Metabolic acidosis





Plan:


* Renal function improving.  Continue current management


* Abx per primary team


* Rate control per cardiology


* Urology following


* Avoid nephrotoxins


* Dose medications for renal function


* ok to dc from renal standpoint








Subjective


Date of service: 03/26/18


Principal diagnosis: Severe Sepsis with Shock; Hydronephrosis with Obstructive 

Uropathy; NATALIE


Interval history: 





resting well in bed today





Objective





- Exam


Narrative Exam: 





General appearance: well-developed, well-nourished, appears stated age


EENT: PERRL, mucous membranes moist


Neck: no JVD, no thyromegaly, no carotid bruit, supple


Respiratory: Present: Clear to Ascultation


Cardiology: regular, normal heart rate, S1S2, no murmurs


Gastrointestinal: normal, normoactive bowel sounds


Integumentary: other (1+ edema . Left leg wrapped with bandage )








- Vital Signs


Vital signs: 


 Vital Signs - 12hr











  03/25/18 03/26/18 03/26/18





  23:52 04:31 07:51


 


Temperature 98.1 F 97.5 F L 97.6 F


 


Pulse Rate 90 100 H 100 H


 


Respiratory 20 20 20





Rate   


 


Blood Pressure 123/78 106/74 124/75


 


Blood Pressure   





[Right]   


 


O2 Sat by Pulse 96 95 94





Oximetry   














  03/26/18 03/26/18





  08:40 09:04


 


Temperature 97.6 F 


 


Pulse Rate 99 H 87


 


Respiratory 20 





Rate  


 


Blood Pressure  


 


Blood Pressure 124/75 





[Right]  


 


O2 Sat by Pulse 94 





Oximetry  














- Lab





 03/26/18 04:50





 03/26/18 04:50


 Most recent lab results











Calcium  8.5 mg/dL (8.4-10.2)   03/26/18  04:50

## 2018-03-27 VITALS — DIASTOLIC BLOOD PRESSURE: 87 MMHG | SYSTOLIC BLOOD PRESSURE: 123 MMHG

## 2018-03-27 LAB
BUN SERPL-MCNC: 37 MG/DL (ref 9–20)
BUN/CREAT SERPL: 28 %
CALCIUM SERPL-MCNC: 9.1 MG/DL (ref 8.4–10.2)
HEMOLYSIS INDEX: 1

## 2018-03-27 RX ADMIN — Medication SCH MCG: at 11:43

## 2018-03-27 RX ADMIN — FUROSEMIDE SCH MG: 40 TABLET ORAL at 06:39

## 2018-03-27 RX ADMIN — LISINOPRIL SCH MG: 40 TABLET ORAL at 11:48

## 2018-03-27 RX ADMIN — POTASSIUM CHLORIDE SCH MEQ: 1500 TABLET, EXTENDED RELEASE ORAL at 11:33

## 2018-03-27 RX ADMIN — INSULIN GLARGINE SCH UNITS: 100 INJECTION, SOLUTION SUBCUTANEOUS at 11:33

## 2018-03-27 RX ADMIN — DILTIAZEM HYDROCHLORIDE SCH MG: 180 CAPSULE, COATED, EXTENDED RELEASE ORAL at 04:17

## 2018-03-27 RX ADMIN — ALLOPURINOL SCH MG: 100 TABLET ORAL at 11:54

## 2018-03-27 RX ADMIN — DOCUSATE SODIUM SCH MG: 100 CAPSULE, LIQUID FILLED ORAL at 11:32

## 2018-03-27 RX ADMIN — Medication SCH EACH: at 11:36

## 2018-03-27 RX ADMIN — Medication SCH ML: at 11:37

## 2018-03-27 RX ADMIN — HEPARIN SODIUM SCH UNIT: 5000 INJECTION, SOLUTION INTRAVENOUS; SUBCUTANEOUS at 06:39

## 2018-03-27 RX ADMIN — OXYCODONE HYDROCHLORIDE AND ACETAMINOPHEN SCH MG: 500 TABLET ORAL at 11:43

## 2018-03-27 RX ADMIN — OMEGA-3 FATTY ACIDS CAP 1000 MG SCH MG: 1000 CAP at 11:32

## 2018-03-27 RX ADMIN — MULTIPLE VITAMINS W/ MINERALS TAB SCH EACH: TAB at 11:37

## 2018-03-27 NOTE — DISCHARGE SUMMARY
Providers





- Providers


Date of Admission: 


03/21/18 13:57





Attending physician: 


YISEL WILLIAM





 





03/21/18 13:43


Consult to Physician [CONS] Urgent 


   Comment: 


   Consulting Provider: AUBRIE FIGUEROA


   Physician Instructions: 


   Reason For Exam: right obstructing ureteral stone, urosepsis


Consult to Physician [CONS] Urgent 


   Comment: 


   Consulting Provider: ZOILA MCNEILL


   Physician Instructions: 


   Reason For Exam: right obstructing ureteral stone, urosepsis





03/21/18 13:56


Consult to Physician [CONS] Stat 


   Comment: 


   Consulting Provider: TAMARA MARTINEZ


   Physician Instructions: 


   Reason For Exam: right obstructing ureteral stone, urosepsis





03/22/18 10:24


Consult to Physician [CONS] Routine 


   Comment: 


   Consulting Provider: KAREN CASAREZ


   Physician Instructions: 


   Reason For Exam: afib with rvr





03/22/18 10:26


Consult to Physician [CONS] Routine 


   Comment: 


   Consulting Provider: MAURISIO YI


   Physician Instructions: 


   Reason For Exam: arf





03/26/18 10:29


Consult to Wound/ET Nurse [CONS] Urgent 


   Reason For Exam: wound eval











Primary care physician: 


ZOILA MCNEILL








Hospitalization


Condition: Stable


Disposition: DC-30 STILL A PATIENT





Exam





- Constitutional


Vitals: 


 











Temp Pulse Resp BP Pulse Ox


 


 98.2 F   98 H  20   123/77   94 


 


 03/27/18 03:52  03/27/18 04:17  03/27/18 03:52  03/27/18 04:17  03/27/18 03:52














Plan


Activity: fall precautions


Weight Bearing Status: Weight Bear as Tolerated


Diet: low fat, low cholesterol, low salt, diabetic


Follow up with: 


ZOILA MCNEILL MD [Primary Care Provider] - 3-5 Days


Prescriptions: 


Diltiazem Cd [Cardizem CD] 180 mg PO Q12H #60 capsule

## 2018-03-27 NOTE — PROGRESS NOTE
Assessment and Plan





Severe sepsis with shock.


Nephrolithiasis with obstructive uropathy.


Morbid obesity.


Obstructive sleep apnea.


Leukocytosis


Mild hyponatremia.  


Mild metabolic acidosis.  


lactic acidosis.  


Elevated serum transaminases








- complete AB's per ID recs


- continue BIPAP qhs / while asleep


- s/p Cysto DVIU with insertion of right ureteral stent


- continue GI & VTE prophylaxis


- PT/OT as tolerated


- gentle hydration 


- Azotemia improving


- definitive stone removal 


- Discharge planning











Subjective


Date of service: 03/20/18


Principal diagnosis: Severe Sepsis with Shock; Hydronephrosis with Obstructive 

Uropathy; NATALIE


Interval history: 





F/UP for severe sepsis with septic shock: nephrolithiasis with obstructive 

uropathy: Morbid obesity with JEAN


Seen and examined.


Vitals, labs, medications, chart reviewed.


Off neosynephrine infusion


Currently has Afib with RVR


Patient has a history of chronic atrial fibrillation and is on eliquis for oral 

anticoagulation. 


 In 2105, he underwent a cardiac cath that reports no significant CAD, normal 

left ventricular ejection fraction. 


He states his night was decent, he tolerated his CPAP machine.


Discharge planning for today








Objective





- Exam


Narrative Exam: 





General appearance: well-developed, well-nourished, appears stated age


EENT: PERRL, mucous membranes moist


Neck: no JVD, no thyromegaly, no carotid bruit, supple


Respiratory: Present: Clear to Ascultation


Cardiology: regular, normal heart rate, S1S2, no murmurs


Gastrointestinal: normal, normoactive bowel sounds


Integumentary: other (1+ edema . Left leg wrapped with bandage )





 Vital Signs - 12hr











  03/27/18 03/27/18 03/27/18





  03:52 04:17 08:09


 


Temperature 98.2 F  98.6 F


 


Pulse Rate 91 H 98 H 93 H


 


Respiratory 20  50 H





Rate   


 


Blood Pressure  123/77 115/62


 


Blood Pressure 108/66  





[Right]   


 


O2 Sat by Pulse 94  96





Oximetry   














  03/27/18 03/27/18





  11:12 11:48


 


Temperature 98.0 F 


 


Pulse Rate 91 H 


 


Respiratory 20 





Rate  


 


Blood Pressure 116/68 123/87


 


Blood Pressure  





[Right]  


 


O2 Sat by Pulse 96 





Oximetry  











Constitutional: no acute distress, other (obese)


Eyes: non-icteric


ENT: oropharynx moist, other (large neck circumference)


Neck: supple, no lymphadenopathy, no JVD, other (no thyromegally; mallampatti 4)


Effort: normal


Ascultation: Bilateral: clear, diminished breath sounds, rhonchi (inspiratory 

in bases L>R)


Percussion: Bilateral: not dull


Cardiovascular: irregular rhythm, other (S1,S2, no murmurs, gallops or rubs)


Gastrointestinal: normoactive bowel sounds, soft, non-tender, non-distended, 

other (No hepatoslenomegaly)


Integumentary: rash (stasis dermatitis to legs)


Extremities: no cyanosis, no edema, pulses normal, other (Chronic venous stasis 

dermatitis)


Neurologic: normal mental status, non-focal exam, pupils equal and round, CN II-

XII normal, motor strength normal and


Psychiatric: mood appropriate, affect normal


CBC and BMP: 


 03/26/18 04:50





 03/27/18 07:56


ABG, PT/INR, D-dimer: 


ABG











POC ABG pH  7.417  (7.35-7.45)   03/26/18  10:30    


 


POC ABG pCO2  39.9  (35-45)   03/26/18  10:30    


 


POC ABG pO2  66  ()  L  03/26/18  10:30    


 


POC ABG HCO3  25.7   03/26/18  10:30    


 


POC ABG Total CO2  27   03/26/18  10:30    


 


POC ABG O2 Sat  93   03/26/18  10:30    





PT/INR, D-dimer











PT  19.9 Sec. (12.2-14.9)  H  03/21/18  16:29    


 


INR  1.59  (0.87-1.13)  H  03/21/18  16:29    








Abnormal lab findings: 


 Abnormal Labs











  03/21/18 03/21/18 03/21/18





  11:27 11:27 11:27


 


WBC  12.1 H  


 


RBC  5.37 H  


 


Hgb  16.3 H  


 


Hct  48.7 H  


 


RDW  15.7 H  


 


Plt Count  136 L  


 


Lymph % (Auto)  6.5 L  


 


Mono % (Auto)  14.7 H  


 


Lymph #  0.8 L  


 


Mono #  1.8 H  


 


Seg Neutrophils %  78.3 H  


 


Lymphocytes % (Manual)   


 


Monocytes % (Manual)   


 


Eosinophils % (Manual)   


 


Seg Neutrophils #  9.5 H  


 


Seg Neutrophils # Man   


 


Lymphocytes # (Manual)   


 


Monocytes # (Manual)   


 


Eosinophils # (Manual)   


 


PT   20.5 H 


 


INR   1.65 H 


 


APTT   40.2 H 


 


POC ABG pO2   


 


VBG pH   


 


Sodium    133 L


 


Potassium   


 


Chloride    93.9 L


 


Carbon Dioxide    20 L


 


BUN    79 H


 


Creatinine    3.8 H


 


Glucose    153 H


 


POC Glucose   


 


Lactic Acid   


 


Calcium   


 


AST    75 H


 


ALT    58 H


 


Alkaline Phosphatase    142 H


 


C-Reactive Protein   


 


Albumin    2.8 L


 


Urine WBC (Auto)   














  03/21/18 03/21/18 03/21/18





  11:27 11:27 11:27


 


WBC   


 


RBC   


 


Hgb   


 


Hct   


 


RDW   


 


Plt Count   


 


Lymph % (Auto)   


 


Mono % (Auto)   


 


Lymph #   


 


Mono #   


 


Seg Neutrophils %   


 


Lymphocytes % (Manual)   


 


Monocytes % (Manual)   


 


Eosinophils % (Manual)   


 


Seg Neutrophils #   


 


Seg Neutrophils # Man   


 


Lymphocytes # (Manual)   


 


Monocytes # (Manual)   


 


Eosinophils # (Manual)   


 


PT   


 


INR   


 


APTT   


 


POC ABG pO2   


 


VBG pH   7.316 L 


 


Sodium   


 


Potassium   


 


Chloride   


 


Carbon Dioxide   


 


BUN   


 


Creatinine   


 


Glucose   


 


POC Glucose   


 


Lactic Acid  2.30 H*  


 


Calcium   


 


AST   


 


ALT   


 


Alkaline Phosphatase   


 


C-Reactive Protein    12.40 H


 


Albumin   


 


Urine WBC (Auto)   














  03/21/18 03/21/18 03/21/18





  13:49 16:29 16:29


 


WBC    11.7 H


 


RBC   


 


Hgb   


 


Hct   


 


RDW    15.9 H


 


Plt Count    121 L


 


Lymph % (Auto)   


 


Mono % (Auto)   


 


Lymph #   


 


Mono #   


 


Seg Neutrophils %   


 


Lymphocytes % (Manual)    5.0 L


 


Monocytes % (Manual)    19.0 H


 


Eosinophils % (Manual)   


 


Seg Neutrophils #   


 


Seg Neutrophils # Man    8.2 H


 


Lymphocytes # (Manual)    0.6 L


 


Monocytes # (Manual)    2.2 H


 


Eosinophils # (Manual)   


 


PT   


 


INR   


 


APTT   


 


POC ABG pO2   


 


VBG pH   


 


Sodium   


 


Potassium   


 


Chloride   


 


Carbon Dioxide   


 


BUN   


 


Creatinine   


 


Glucose   


 


POC Glucose   


 


Lactic Acid   2.20 H* 


 


Calcium   


 


AST   


 


ALT   


 


Alkaline Phosphatase   


 


C-Reactive Protein   


 


Albumin   


 


Urine WBC (Auto)  37.0 H  














  03/21/18 03/21/18 03/21/18





  16:29 17:22 20:49


 


WBC   


 


RBC   


 


Hgb   


 


Hct   


 


RDW   


 


Plt Count   


 


Lymph % (Auto)   


 


Mono % (Auto)   


 


Lymph #   


 


Mono #   


 


Seg Neutrophils %   


 


Lymphocytes % (Manual)   


 


Monocytes % (Manual)   


 


Eosinophils % (Manual)   


 


Seg Neutrophils #   


 


Seg Neutrophils # Man   


 


Lymphocytes # (Manual)   


 


Monocytes # (Manual)   


 


Eosinophils # (Manual)   


 


PT  19.9 H  


 


INR  1.59 H  


 


APTT  43.9 H  


 


POC ABG pO2   


 


VBG pH   


 


Sodium   


 


Potassium   


 


Chloride   


 


Carbon Dioxide   18 L 


 


BUN   70 H 


 


Creatinine   3.3 H 


 


Glucose   


 


POC Glucose   


 


Lactic Acid    2.10 H*


 


Calcium   8.1 L 


 


AST   


 


ALT   


 


Alkaline Phosphatase   


 


C-Reactive Protein   


 


Albumin   


 


Urine WBC (Auto)   














  03/21/18 03/22/18 03/22/18





  20:49 11:40 17:25


 


WBC   


 


RBC   


 


Hgb   


 


Hct   


 


RDW   


 


Plt Count   


 


Lymph % (Auto)   


 


Mono % (Auto)   


 


Lymph #   


 


Mono #   


 


Seg Neutrophils %   


 


Lymphocytes % (Manual)   


 


Monocytes % (Manual)   


 


Eosinophils % (Manual)   


 


Seg Neutrophils #   


 


Seg Neutrophils # Man   


 


Lymphocytes # (Manual)   


 


Monocytes # (Manual)   


 


Eosinophils # (Manual)   


 


PT   


 


INR   


 


APTT   


 


POC ABG pO2   


 


VBG pH   


 


Sodium  135 L  


 


Potassium   


 


Chloride   


 


Carbon Dioxide  15 L  


 


BUN  70 H  


 


Creatinine  3.1 H  


 


Glucose  108 H  


 


POC Glucose   169 H  313 H


 


Lactic Acid   


 


Calcium   


 


AST   


 


ALT   


 


Alkaline Phosphatase   


 


C-Reactive Protein   


 


Albumin   


 


Urine WBC (Auto)   














  03/22/18 03/23/18 03/23/18





  17:41 00:11 03:58


 


WBC   


 


RBC    5.10 H


 


Hgb   


 


Hct    46.9 H


 


RDW    15.9 H


 


Plt Count    135 L


 


Lymph % (Auto)   


 


Mono % (Auto)   


 


Lymph #   


 


Mono #   


 


Seg Neutrophils %   


 


Lymphocytes % (Manual)    9.0 L


 


Monocytes % (Manual)    14.0 H


 


Eosinophils % (Manual)   


 


Seg Neutrophils #   


 


Seg Neutrophils # Man   


 


Lymphocytes # (Manual)    0.9 L


 


Monocytes # (Manual)    1.3 H


 


Eosinophils # (Manual)   


 


PT   


 


INR   


 


APTT   


 


POC ABG pO2   


 


VBG pH   


 


Sodium   


 


Potassium   


 


Chloride   


 


Carbon Dioxide  20 L  


 


BUN  61 H  


 


Creatinine  2.5 H  


 


Glucose  315 H  


 


POC Glucose   277 H 


 


Lactic Acid   


 


Calcium   


 


AST   


 


ALT   


 


Alkaline Phosphatase   


 


C-Reactive Protein   


 


Albumin   


 


Urine WBC (Auto)   














  03/23/18 03/23/18 03/23/18





  03:58 05:02 11:35


 


WBC   


 


RBC   


 


Hgb   


 


Hct   


 


RDW   


 


Plt Count   


 


Lymph % (Auto)   


 


Mono % (Auto)   


 


Lymph #   


 


Mono #   


 


Seg Neutrophils %   


 


Lymphocytes % (Manual)   


 


Monocytes % (Manual)   


 


Eosinophils % (Manual)   


 


Seg Neutrophils #   


 


Seg Neutrophils # Man   


 


Lymphocytes # (Manual)   


 


Monocytes # (Manual)   


 


Eosinophils # (Manual)   


 


PT   


 


INR   


 


APTT   


 


POC ABG pO2   


 


VBG pH   


 


Sodium   


 


Potassium   


 


Chloride   


 


Carbon Dioxide  20 L  


 


BUN  54 H  


 


Creatinine  1.9 H  


 


Glucose  243 H  


 


POC Glucose   243 H  288 H


 


Lactic Acid   


 


Calcium   


 


AST   


 


ALT   


 


Alkaline Phosphatase   


 


C-Reactive Protein   


 


Albumin   


 


Urine WBC (Auto)   














  03/23/18 03/24/18 03/24/18





  17:18 04:54 04:54


 


WBC   


 


RBC   5.46 H 


 


Hgb   16.4 H 


 


Hct   49.6 H 


 


RDW   16.1 H 


 


Plt Count   


 


Lymph % (Auto)   


 


Mono % (Auto)   


 


Lymph #   


 


Mono #   


 


Seg Neutrophils %   


 


Lymphocytes % (Manual)   13.0 L 


 


Monocytes % (Manual)   14.0 H 


 


Eosinophils % (Manual)   5.0 H 


 


Seg Neutrophils #   


 


Seg Neutrophils # Man   


 


Lymphocytes # (Manual)   


 


Monocytes # (Manual)   1.3 H 


 


Eosinophils # (Manual)   0.5 H 


 


PT   


 


INR   


 


APTT   


 


POC ABG pO2   


 


VBG pH   


 


Sodium    146 H


 


Potassium   


 


Chloride    107.5 H


 


Carbon Dioxide   


 


BUN    46 H


 


Creatinine    1.6 H


 


Glucose    240 H


 


POC Glucose  275 H  


 


Lactic Acid   


 


Calcium   


 


AST   


 


ALT   


 


Alkaline Phosphatase   


 


C-Reactive Protein   


 


Albumin   


 


Urine WBC (Auto)   














  03/24/18 03/24/18 03/24/18





  05:32 11:22 15:48


 


WBC   


 


RBC   


 


Hgb   


 


Hct   


 


RDW   


 


Plt Count   


 


Lymph % (Auto)   


 


Mono % (Auto)   


 


Lymph #   


 


Mono #   


 


Seg Neutrophils %   


 


Lymphocytes % (Manual)   


 


Monocytes % (Manual)   


 


Eosinophils % (Manual)   


 


Seg Neutrophils #   


 


Seg Neutrophils # Man   


 


Lymphocytes # (Manual)   


 


Monocytes # (Manual)   


 


Eosinophils # (Manual)   


 


PT   


 


INR   


 


APTT   


 


POC ABG pO2   


 


VBG pH   


 


Sodium   


 


Potassium   


 


Chloride   


 


Carbon Dioxide   


 


BUN   


 


Creatinine   


 


Glucose   


 


POC Glucose  223 H  310 H  326 H


 


Lactic Acid   


 


Calcium   


 


AST   


 


ALT   


 


Alkaline Phosphatase   


 


C-Reactive Protein   


 


Albumin   


 


Urine WBC (Auto)   














  03/24/18 03/25/18 03/25/18





  23:57 04:31 06:45


 


WBC   


 


RBC    5.21 H


 


Hgb    15.9 H


 


Hct    47.5 H


 


RDW    16.4 H


 


Plt Count   


 


Lymph % (Auto)   


 


Mono % (Auto)    11.7 H


 


Lymph #   


 


Mono #    1.2 H


 


Seg Neutrophils %    71.2 H


 


Lymphocytes % (Manual)   


 


Monocytes % (Manual)   


 


Eosinophils % (Manual)   


 


Seg Neutrophils #   


 


Seg Neutrophils # Man   


 


Lymphocytes # (Manual)   


 


Monocytes # (Manual)   


 


Eosinophils # (Manual)   


 


PT   


 


INR   


 


APTT   


 


POC ABG pO2   


 


VBG pH   


 


Sodium   


 


Potassium   


 


Chloride   


 


Carbon Dioxide   


 


BUN   


 


Creatinine   


 


Glucose   


 


POC Glucose  143 H  193 H 


 


Lactic Acid   


 


Calcium   


 


AST   


 


ALT   


 


Alkaline Phosphatase   


 


C-Reactive Protein   


 


Albumin   


 


Urine WBC (Auto)   














  03/25/18 03/25/18 03/25/18





  06:45 12:12 17:14


 


WBC   


 


RBC   


 


Hgb   


 


Hct   


 


RDW   


 


Plt Count   


 


Lymph % (Auto)   


 


Mono % (Auto)   


 


Lymph #   


 


Mono #   


 


Seg Neutrophils %   


 


Lymphocytes % (Manual)   


 


Monocytes % (Manual)   


 


Eosinophils % (Manual)   


 


Seg Neutrophils #   


 


Seg Neutrophils # Man   


 


Lymphocytes # (Manual)   


 


Monocytes # (Manual)   


 


Eosinophils # (Manual)   


 


PT   


 


INR   


 


APTT   


 


POC ABG pO2   


 


VBG pH   


 


Sodium   


 


Potassium   


 


Chloride   


 


Carbon Dioxide   


 


BUN  43 H  


 


Creatinine   


 


Glucose  233 H  


 


POC Glucose   332 H  263 H


 


Lactic Acid   


 


Calcium   


 


AST   


 


ALT   


 


Alkaline Phosphatase   


 


C-Reactive Protein   


 


Albumin   


 


Urine WBC (Auto)   














  03/25/18 03/26/18 03/26/18





  21:45 00:01 04:50


 


WBC   


 


RBC   


 


Hgb    15.3 H


 


Hct   


 


RDW    16.0 H


 


Plt Count   


 


Lymph % (Auto)   


 


Mono % (Auto)    11.5 H


 


Lymph #   


 


[place] #    1.0 H


 


Seg Neutrophils %   


 


Lymphocytes % (Manual)   


 


Monocytes % (Manual)   


 


Eosinophils % (Manual)   


 


Seg Neutrophils #   


 


Seg Neutrophils # Man   


 


Lymphocytes # (Manual)   


 


Monocytes # (Manual)   


 


Eosinophils # (Manual)   


 


PT   


 


INR   


 


APTT   


 


POC ABG pO2   


 


VBG pH   


 


Sodium   


 


Potassium   


 


Chloride   


 


Carbon Dioxide   


 


BUN   


 


Creatinine   


 


Glucose   


 


POC Glucose  232 H  190 H 


 


Lactic Acid   


 


Calcium   


 


AST   


 


ALT   


 


Alkaline Phosphatase   


 


C-Reactive Protein   


 


Albumin   


 


Urine WBC (Auto)   














  03/26/18 03/26/18 03/26/18





  04:50 06:41 10:30


 


WBC   


 


RBC   


 


Hgb   


 


Hct   


 


RDW   


 


Plt Count   


 


Lymph % (Auto)   


 


Mono % (Auto)   


 


Lymph #   


 


Mono #   


 


Seg Neutrophils %   


 


Lymphocytes % (Manual)   


 


Monocytes % (Manual)   


 


Eosinophils % (Manual)   


 


Seg Neutrophils #   


 


Seg Neutrophils # Man   


 


Lymphocytes # (Manual)   


 


Monocytes # (Manual)   


 


Eosinophils # (Manual)   


 


PT   


 


INR   


 


APTT   


 


POC ABG pO2    66 L


 


VBG pH   


 


Sodium   


 


Potassium  3.4 L  


 


Chloride   


 


Carbon Dioxide   


 


BUN  41 H  


 


Creatinine   


 


Glucose  246 H  


 


POC Glucose   204 H 


 


Lactic Acid   


 


Calcium   


 


AST   


 


ALT   


 


Alkaline Phosphatase   


 


C-Reactive Protein   


 


Albumin   


 


Urine WBC (Auto)   














  03/26/18 03/26/18 03/26/18





  10:36 16:25 22:36


 


WBC   


 


RBC   


 


Hgb   


 


Hct   


 


RDW   


 


Plt Count   


 


Lymph % (Auto)   


 


Mono % (Auto)   


 


Lymph #   


 


Mono #   


 


Seg Neutrophils %   


 


Lymphocytes % (Manual)   


 


Monocytes % (Manual)   


 


Eosinophils % (Manual)   


 


Seg Neutrophils #   


 


Seg Neutrophils # Man   


 


Lymphocytes # (Manual)   


 


Monocytes # (Manual)   


 


Eosinophils # (Manual)   


 


PT   


 


INR   


 


APTT   


 


POC ABG pO2   


 


VBG pH   


 


Sodium   


 


Potassium   


 


Chloride   


 


Carbon Dioxide   


 


BUN   


 


Creatinine   


 


Glucose   


 


POC Glucose  318 H  366 H  225 H


 


Lactic Acid   


 


Calcium   


 


AST   


 


ALT   


 


Alkaline Phosphatase   


 


C-Reactive Protein   


 


Albumin   


 


Urine WBC (Auto)   














  03/27/18 03/27/18 03/27/18





  05:58 07:56 11:17


 


WBC   


 


RBC   


 


Hgb   


 


Hct   


 


RDW   


 


Plt Count   


 


Lymph % (Auto)   


 


Mono % (Auto)   


 


Lymph #   


 


Mono #   


 


Seg Neutrophils %   


 


Lymphocytes % (Manual)   


 


Monocytes % (Manual)   


 


Eosinophils % (Manual)   


 


Seg Neutrophils #   


 


Seg Neutrophils # Man   


 


Lymphocytes # (Manual)   


 


Monocytes # (Manual)   


 


Eosinophils # (Manual)   


 


PT   


 


INR   


 


APTT   


 


POC ABG pO2   


 


VBG pH   


 


Sodium   


 


Potassium   


 


Chloride   


 


Carbon Dioxide   


 


BUN   37 H 


 


Creatinine   


 


Glucose   210 H 


 


POC Glucose  199 H   288 H


 


Lactic Acid   


 


Calcium   


 


AST   


 


ALT   


 


Alkaline Phosphatase   


 


C-Reactive Protein   


 


Albumin   


 


Urine WBC (Auto)   











Allied health notes reviewed: nursing

## 2018-03-27 NOTE — PROGRESS NOTE
Assessment and Plan





urine clear 


cooley out today 


improved 


f/u ureteroscopy 





Subjective


Date of service: 03/27/18


Principal diagnosis: Severe Sepsis with Shock; Hydronephrosis with Obstructive 

Uropathy; NATALIE





Objective





- Constitutional


Vitals: 


 Vital Signs - 12hr











  03/26/18 03/27/18 03/27/18





  23:44 00:24 00:25


 


Temperature  97.2 F L 


 


Pulse Rate  98 H 103 H


 


Respiratory  20 





Rate   


 


Blood Pressure  123/77 


 


Blood Pressure   





[Right]   


 


O2 Sat by Pulse 96 93 93





Oximetry   














  03/27/18 03/27/18 03/27/18





  03:52 04:17 08:09


 


Temperature 98.2 F  98.6 F


 


Pulse Rate 91 H 98 H 93 H


 


Respiratory 20  50 H





Rate   


 


Blood Pressure  123/77 115/62


 


Blood Pressure 108/66  





[Right]   


 


O2 Sat by Pulse 94  96





Oximetry   











General appearance: Present: no acute distress





- Respiratory


Respiratory effort: normal


Extremity abnormal: edema





- Labs


CBC & Chem 7: 


 03/26/18 04:50





 03/27/18 07:56


Labs: 


 Abnormal lab results











  03/26/18 03/26/18 03/26/18 Range/Units





  10:30 10:36 16:25 


 


POC ABG pO2  66 L    ()  


 


BUN     (9-20)  mg/dL


 


Glucose     ()  mg/dL


 


POC Glucose   318 H  366 H  ()  














  03/26/18 03/27/18 03/27/18 Range/Units





  22:36 05:58 07:56 


 


POC ABG pO2     ()  


 


BUN    37 H  (9-20)  mg/dL


 


Glucose    210 H  ()  mg/dL


 


POC Glucose  225 H  199 H   ()

## 2018-03-27 NOTE — PROGRESS NOTE
Assessment and Plan





Sepsis


Acute kidney injury 


Right ureteral stone with obstruction


   s/p cystoscopy, urethrotomy and stent.


Right hydronephrosis, moderate 


Atrial fibrillation, chronic


   rate control with Cardizem CD


   on eliquis for oral anticoagulation as an outpatient


Diabetes mellitus





Recommendations:


Continue rate controlling agents for chronic atrial fibrillation.


Resume eliquis when surgically feasible.





Subjective


Date of service: 03/27/18


Principal diagnosis: Severe Sepsis with Shock; Hydronephrosis with Obstructive 

Uropathy; NATALIE


Interval history: 





No interval changes.





Objective


 Vital Signs











  Temp Pulse Resp BP BP Pulse Ox


 


 03/27/18 08:09  98.6 F  93 H  50 H  115/62   96


 


 03/27/18 04:17   98 H   123/77  


 


 03/27/18 03:52  98.2 F  91 H  20   108/66  94


 


 03/27/18 00:25   103 H     93


 


 03/27/18 00:24  97.2 F L  98 H  20  123/77   93


 


 03/26/18 23:44       96


 


 03/26/18 20:16  97.8 F  98 H  20   124/74  97


 


 03/26/18 19:48   92 H    


 


 03/26/18 19:40   105 H     96


 


 03/26/18 16:21  97.8 F  106 H  20  123/71   95


 


 03/26/18 13:04  98.6 F  95 H  20   120/73  95


 


 03/26/18 13:03   109 H    


 


 03/26/18 12:57  98.6 F  60  20   155/78  95


 


 03/26/18 12:09  98.6 F  91 H  20  120/73   94














- Physical Examination


General: No Apparent Distress


HEENT: Positive: PERRL


Cardiac: Positive: irregularly irregular


Neuro: Positive: Grossly Intact





- Labs and Meds


 Comprehensive Metabolic Panel











  03/27/18 Range/Units





  07:56 


 


Sodium  138  (137-145)  mmol/L


 


Potassium  3.7  (3.6-5.0)  mmol/L


 


Chloride  99.4  ()  mmol/L


 


Carbon Dioxide  28  (22-30)  mmol/L


 


BUN  37 H  (9-20)  mg/dL


 


Creatinine  1.3  (0.8-1.5)  mg/dL


 


Glucose  210 H  ()  mg/dL


 


Calcium  9.1  (8.4-10.2)  mg/dL














- Allied health notes


Allied health notes reviewed: nursing

## 2018-03-27 NOTE — PROGRESS NOTE
Assessment and Plan








Impression:


* Acute kidney injury secondary to multifactorial etiologies:  sepsis related 

ATN vs obstructive uropathy


* Right ureteral stone w/ obstruction, right hydronephrosis; s/p cystoscopy, 

urethrotomy and stent.


* Sepsis


* Atrial fibrillation w/ RVR


* Chronic anticoagulation


* Metabolic acidosis





Plan:


* Renal function improving.  Continue current management


* Abx per primary team


* Rate control per cardiology


* Urology following


* Avoid nephrotoxins


* Dose medications for renal function


* ok to dc from renal standpoint








Subjective


Date of service: 03/27/18


Principal diagnosis: Severe Sepsis with Shock; Hydronephrosis with Obstructive 

Uropathy; NATALIE


Interval history: 





resting well in bed today





Objective





- Exam


Narrative Exam: 





General appearance: well-developed, well-nourished, appears stated age


EENT: PERRL, mucous membranes moist


Neck: no JVD, no thyromegaly, no carotid bruit, supple


Respiratory: Present: Clear to Ascultation


Cardiology: regular, normal heart rate, S1S2, no murmurs


Gastrointestinal: normal, normoactive bowel sounds


Integumentary: other (1+ edema . Left leg wrapped with bandage )








- Vital Signs


Vital signs: 


 Vital Signs - 12hr











  03/26/18 03/27/18 03/27/18





  23:44 00:24 00:25


 


Temperature  97.2 F L 


 


Pulse Rate  98 H 103 H


 


Respiratory  20 





Rate   


 


Blood Pressure  123/77 


 


Blood Pressure   





[Right]   


 


O2 Sat by Pulse 96 93 93





Oximetry   














  03/27/18 03/27/18 03/27/18





  03:52 04:17 08:09


 


Temperature 98.2 F  98.6 F


 


Pulse Rate 91 H 98 H 93 H


 


Respiratory 20  50 H





Rate   


 


Blood Pressure  123/77 115/62


 


Blood Pressure 108/66  





[Right]   


 


O2 Sat by Pulse 94  96





Oximetry   














- Lab





 03/26/18 04:50





 03/27/18 07:56


 Most recent lab results











Calcium  9.1 mg/dL (8.4-10.2)   03/27/18  07:56

## 2022-04-30 ENCOUNTER — TELEPHONE ENCOUNTER (OUTPATIENT)
Dept: URBAN - METROPOLITAN AREA CLINIC 121 | Facility: CLINIC | Age: 77
End: 2022-04-30

## 2022-05-01 ENCOUNTER — TELEPHONE ENCOUNTER (OUTPATIENT)
Dept: URBAN - METROPOLITAN AREA CLINIC 121 | Facility: CLINIC | Age: 77
End: 2022-05-01